# Patient Record
Sex: FEMALE | Race: WHITE | NOT HISPANIC OR LATINO | Employment: FULL TIME | URBAN - METROPOLITAN AREA
[De-identification: names, ages, dates, MRNs, and addresses within clinical notes are randomized per-mention and may not be internally consistent; named-entity substitution may affect disease eponyms.]

---

## 2021-09-07 ENCOUNTER — CLINICAL SUPPORT (OUTPATIENT)
Dept: BARIATRICS | Facility: CLINIC | Age: 31
End: 2021-09-07

## 2021-09-07 VITALS
HEART RATE: 70 BPM | BODY MASS INDEX: 42.89 KG/M2 | TEMPERATURE: 98 F | HEIGHT: 63 IN | WEIGHT: 242.1 LBS | SYSTOLIC BLOOD PRESSURE: 128 MMHG | DIASTOLIC BLOOD PRESSURE: 78 MMHG

## 2021-09-07 DIAGNOSIS — E03.9 HYPOTHYROID: ICD-10-CM

## 2021-09-07 DIAGNOSIS — Z01.812 BLOOD TESTS PRIOR TO TREATMENT OR PROCEDURE: ICD-10-CM

## 2021-09-07 DIAGNOSIS — Z01.818 PREOPERATIVE CLEARANCE: ICD-10-CM

## 2021-09-07 DIAGNOSIS — E66.01 OBESITY, CLASS III, BMI 40-49.9 (MORBID OBESITY) (HCC): Primary | ICD-10-CM

## 2021-09-07 PROCEDURE — RECHECK: Performed by: DIETITIAN, REGISTERED

## 2021-09-07 RX ORDER — LEVONORGESTREL AND ETHINYL ESTRADIOL 0.1-0.02MG
KIT ORAL
COMMUNITY
Start: 2021-08-13 | End: 2022-06-15

## 2021-09-07 RX ORDER — LEVOTHYROXINE SODIUM 112 UG/1
112 TABLET ORAL DAILY
Status: ON HOLD | COMMUNITY
Start: 2021-08-03 | End: 2022-06-14

## 2021-09-07 NOTE — PROGRESS NOTES
Bariatric Nutrition Assessment Note    Type of surgery    Preop 4 months  Surgery Date: TBD- tentative February 2021  Surgeon: TBD    Nutrition Assessment   Tenisha Kannan Gins  26 y o   female     Wt with BMI of 25: 141 1lbs  Pre-Op Excess Wt: 101lbs  Ht 5' 3" (1 6 m)   Wt 110 kg (242 lb 1 6 oz)   BMI 42 89 kg/m²     [place] [address]  Linkor Equation:     Weight maintenance= 2133 kcal/day  Estimated calories for weight loss 3626-3422 kcal/day ( 1-2# per wk wt loss - sedentary )  Estimated protein needs 64 2-77 g/day (1 0-1 2 gms/kg IBW )   Estimated fluid needs 0183-0942 ml/day (30-35 ml/kg IBW )      Weight History   Onset of Obesity: Childhood  Family history of obesity: Yes: mother had gastric bypass many years ago, sister had sleeve gastrectomy  Wt Loss Attempts: Commercial Programs ([employer], Ana Howell, etc )  Exercise  FAD Diets (Cabbage soup, Grapefruit, Cleanse, etc )  High Protein/Low CHO diets (Atkins, Union, etc )  Self Created Diets (Portion Control, Healthy Food Choices, etc )  Patient has tried the above for 6 months or more with insufficient weight loss or weight regain, which is why patient has requested to be evaluated for weight loss surgery today  Maximum Wt Lost: 45lbs    Review of History and Medications   No past medical history on file  No past surgical history on file    Social History     Socioeconomic History    Marital status: Single     Spouse name: Not on file    Number of children: Not on file    Years of education: Not on file    Highest education level: Not on file   Occupational History    Not on file   Tobacco Use    Smoking status: Not on file   Substance and Sexual Activity    Alcohol use: Not on file    Drug use: Not on file    Sexual activity: Not on file   Other Topics Concern    Not on file   Social History Narrative    Not on file     Social Determinants of Health     Financial Resource Strain:     Difficulty of Paying Living Expenses:    Food Insecurity:  Worried About [facility] in the Last Year:    [address] in the Last Year:    Transportation Needs:     Lack of Transportation (Medical):  Lack of Transportation (Non-Medical):    Physical Activity:     Days of Exercise per Week:     Minutes of Exercise per Session:    Stress:     Feeling of Stress :    Social Connections:     Frequency of Communication with Friends and Family:     Frequency of Social Gatherings with Friends and Family:     Attends Sabianism Services:     Active Member of Clubs or Organizations:     Attends Club or Organization Meetings:     Marital Status:    Intimate Partner Violence:     Fear of Current or Ex-Partner:     Emotionally Abused:     Physically Abused:     Sexually Abused:      No current outpatient medications on file  Food Intake and Lifestyle Assessment   Food Intake Assessment completed via usual diet recall  Very difficult diet recall by patient  Breakfast: Ninfa: ham, maurer, egg muffin sandwich with iced otr hot medium pumpkin coffee with cream   Sometimes skips  Sometimes eggs or yogurt    Snack: none   Lunch: piece of sausage  Snack: none  Dinner: chicken with mozzarella cheese and tomatoes, buffalo dip with tortila chips, pasta  Snack: none  Beverage intake: regular/sweetened green tea, regular mountain dew or coke, water, coffee  Protein supplement: none  Estimated protein intake per day: 30-60g  Estimated fluid intake per day: red solo cup of green tea, one water, soda occasionally  Meals eaten away from home: most: chipolte, pizza,  Sandwiches, etc  Typical meal pattern: 2 meals per day and 0 snacks per day  Eating Behaviors: Emotional eating, craves starches/carbohydrates, skips lunch  Food allergies or intolerances: Not on File FA  Cultural or Congregation considerations: none noted    Physical Assessment  Physical Activity  Types of exercise: Walking once a week or so for approx 20-30 mins  Current physical limitations: chronic fibromyalgia pain  Used to do kickboxing and [employer] workouts  Psychosocial Assessment   Support systems: spouse and 2 children  Socioeconomic factors: Pt's mother-in-law Miriam Mcdermott accompanied her to appointment    Nutrition Diagnosis  Diagnosis: Overweight / Obesity (NC-3 3), Excessive energy intake (NI-1 5), Inadequate protein intake (NI-5 7 3), Undesirable food choices (NB-1 7) and Disordered eating pattern (NB-1 5)  Related to: Food and nutrition-related knowledge deficit, Physical inactivity, Excessive energy intake and Inability or lack of desire to manage self-care  As Evidenced by: BMI >25, Excessive energy intake, Unintentional weight gain and Reports of disorded eating patterns     Nutrition Prescription: Recommend the following diet  Regular    Interventions and Teaching   Discussed pre-op and post-op nutrition guidelines  Patient educated and handouts provided  Surgical changes to stomach / GI  Capacity of post-surgery stomach  Diet progression  Adequate hydration  Sugar and fat restriction to decrease "dumping syndrome"  Expected weight loss  Weight loss plateaus/ possibility of weight regain  Exercise  Suggestions for pre-op diet  Nutrition considerations after surgery  Protein supplements  Meal planning and preparation  Appropriate carbohydrate, protein, and fat intake, and food/fluid choices to maximize safe weight loss, nutrient intake, and tolerance   Dietary and lifestyle changes  Possible problems with poor eating habits  Techniques for self monitoring and keeping daily food journal  Potential for food intolerance after surgery, and ways to deal with them including: lactose intolerance, nausea, reflux, vomiting, diarrhea, food intolerance, appetite changes, gas  Vitamin / Mineral supplementation of Multivitamin with minerals and Vitamin D  Pt currently takes Vitamin D3, history of deficiency      Patient is not currently pregnant and doesn't desire to become pregnant a minimum of one year post-op    Education provided to: patient and mother-in-law  Barriers to learning: No barriers identified  Readiness to change: contemplation and preparation  Prior research on procedure: internet, discussed with provider, pre-op class and friends or family  Comprehension: needs reinforcement and verbalizes understanding   Expected Compliance: good    Recommendations  Pt is an appropriate candidate for surgery   Yes    Evaluation / Monitoring  Dietitian to Monitor: Eating pattern as discussed Tolerance of nutrition prescription Body weight Lab values Physical activity Bowel pattern    Goals  Eliminate sugar sweetened beverages, Food journal, Exercise 30 minutes 5 times per week, Complete lession plans 1-6, Eat 3 meals per day and Eliminate mindless snacking    Time Spent:   1 Hour

## 2021-09-07 NOTE — PROGRESS NOTES
Bariatric Behavioral Health Evaluation    Presenting Problem patient here to improve health, increase activity, reduce chronic pain and prevent family disease  Is the patient seeking Bariatric Surgery Eval? Yes  If yes how long have you researched this surgery option  Her mother and sister both had bariatric surgery  Realizes Post- Op Requirements? Yes     Pre-morbid level of function and history of present illness: patient has medical issues  Psychiatric/Psychological Treatment Diagnosis: Patient reports Mental Health diagnosis of Depression  She is prescribed medication by her OB/GYN physician  Outpatient Counselor Yes; patient used counseling in the past to work on family related issues  Psychiatrist No     Have you had Inpatient Treatment? No    Family Constellation (include relationship with each and Psych/Med HX)    Mother  obesity and mental health illness and Siblings  obesity    Domestic Violence No      Additional comments/stressors related to family/relationships/peer support  Patient reports support from family and friends  Physical/Psychological Assessment:     Appearance: appropriate  Sociability: friendly  Affect: appropriate  Mood: calm  Thought Process: coherent  Speech: normal  Content: no impairment  Orientation: person  Yes , place  Yes , time  Yes , normal attention span  Yes , normal memory  Yes   and normal judgement  Yes   Insight: emotional  good    Risk Assessment:     none    Recommendations: Recommended for surgery  yes    Risk of Harm to Self or Others: none reported  Observation:     Interviews this interview only  Based on the previous information, the client presents the following risk of harm to self or others: low     Note Patient reports Mental Health diagnosis of Depression  Patient is prescribed medication by her PCP  Patient educated regarding the impact of nicotine and alcohol on the post surgery bariatric patient   Patient has a positive family history for obesity, and mental illness  Patient meets criteria for surgery at this program and is referred to the physician  BARIATRIC SURGERY EDUCATION CHECKLIST    I have received education related to my bariatric surgery process and understand:    Patients may be required to complete a psychiatric evaluation and receive clearance for surgery from their psychiatrist     Patients who undergo weight loss surgery are at higher risk of increased mental health concerns and suicide attempts  Patients may be required to complete a full substance abuse evaluation and then complete all treatment recommendations prior to surgery  If diagnosis of abuse/dependence results, patient may be required to remain sober for one (1) year before having bariatric surgery  Patients on psychiatric medications should check with their provider to discuss psychiatric medications and the changes in absorption  Patient should discuss all time release medications with provider and take all medications as prescribed  The recommendation is that there is no use of  any tobacco products, Hookah or  vapes for the bariatric post-operation patient  Bariatric surgery patients should not consume alcohol as a post-operative patient as it may increase risk of numerous health conditions including but not limited to alcohol abuse and ulcers  There is a possibility of weight regain if patient does not follow all program guidelines and recommendations  Bariatric surgery patients should exercise thirty (30) to sixty (60) minutes per day to maintain post-surgical weight loss  Research indicates that bariatric patients are more successful when they see a therapist for up to two (2) years post-op  Patients will follow all medical and dietary recommendations provided  Patient will keep all scheduled appointments and follow up with their physician for a minimum of five (5) years      Patient will take all vitamins as recommended  Post-operative vitamins are life-long  Patient reviewed Bariatric Surgery Education Checklist and agrees they have received education on these issues

## 2021-10-08 ENCOUNTER — TELEPHONE (OUTPATIENT)
Dept: OTHER | Facility: OTHER | Age: 31
End: 2021-10-08

## 2021-10-27 ENCOUNTER — OFFICE VISIT (OUTPATIENT)
Dept: BARIATRICS | Facility: CLINIC | Age: 31
End: 2021-10-27

## 2021-10-27 DIAGNOSIS — E66.01 OBESITY, CLASS III, BMI 40-49.9 (MORBID OBESITY) (HCC): Primary | ICD-10-CM

## 2021-10-27 PROCEDURE — RECHECK

## 2021-10-28 VITALS — BODY MASS INDEX: 42.99 KG/M2 | WEIGHT: 242.7 LBS

## 2021-11-18 ENCOUNTER — OFFICE VISIT (OUTPATIENT)
Dept: BARIATRICS | Facility: CLINIC | Age: 31
End: 2021-11-18

## 2021-11-18 VITALS — HEIGHT: 63 IN | BODY MASS INDEX: 42.77 KG/M2 | WEIGHT: 241.4 LBS

## 2021-11-18 DIAGNOSIS — E66.01 MORBID (SEVERE) OBESITY DUE TO EXCESS CALORIES (HCC): Primary | ICD-10-CM

## 2021-11-18 PROCEDURE — RECHECK: Performed by: DIETITIAN, REGISTERED

## 2021-12-14 DIAGNOSIS — Z01.818 PREOPERATIVE CLEARANCE: ICD-10-CM

## 2021-12-14 DIAGNOSIS — E03.9 HYPOTHYROID: ICD-10-CM

## 2021-12-14 DIAGNOSIS — R79.89 ABNORMAL TSH: Primary | ICD-10-CM

## 2021-12-14 DIAGNOSIS — E66.01 OBESITY, CLASS III, BMI 40-49.9 (MORBID OBESITY) (HCC): ICD-10-CM

## 2021-12-14 LAB
ALBUMIN SERPL-MCNC: 4.4 G/DL (ref 3.8–4.8)
ALBUMIN/GLOB SERPL: 1.8 {RATIO} (ref 1.2–2.2)
ALP SERPL-CCNC: 88 IU/L (ref 44–121)
ALT SERPL-CCNC: 16 IU/L (ref 0–32)
AST SERPL-CCNC: 17 IU/L (ref 0–40)
BASOPHILS # BLD AUTO: 0 X10E3/UL (ref 0–0.2)
BASOPHILS NFR BLD AUTO: 0 %
BILIRUB SERPL-MCNC: 0.6 MG/DL (ref 0–1.2)
BUN SERPL-MCNC: 12 MG/DL (ref 6–20)
BUN/CREAT SERPL: 16 (ref 9–23)
CALCIUM SERPL-MCNC: 9.3 MG/DL (ref 8.7–10.2)
CHLORIDE SERPL-SCNC: 102 MMOL/L (ref 96–106)
CHOLEST SERPL-MCNC: 161 MG/DL (ref 100–199)
CO2 SERPL-SCNC: 23 MMOL/L (ref 20–29)
CREAT SERPL-MCNC: 0.77 MG/DL (ref 0.57–1)
EOSINOPHIL # BLD AUTO: 0.3 X10E3/UL (ref 0–0.4)
EOSINOPHIL NFR BLD AUTO: 3 %
ERYTHROCYTE [DISTWIDTH] IN BLOOD BY AUTOMATED COUNT: 13.2 % (ref 11.7–15.4)
GLOBULIN SER-MCNC: 2.4 G/DL (ref 1.5–4.5)
GLUCOSE SERPL-MCNC: 80 MG/DL (ref 65–99)
HCT VFR BLD AUTO: 36.2 % (ref 34–46.6)
HDLC SERPL-MCNC: 45 MG/DL
HGB BLD-MCNC: 12.1 G/DL (ref 11.1–15.9)
IMM GRANULOCYTES # BLD: 0 X10E3/UL (ref 0–0.1)
IMM GRANULOCYTES NFR BLD: 0 %
LDLC SERPL CALC-MCNC: 88 MG/DL (ref 0–99)
LYMPHOCYTES # BLD AUTO: 2.8 X10E3/UL (ref 0.7–3.1)
LYMPHOCYTES NFR BLD AUTO: 28 %
MCH RBC QN AUTO: 29.8 PG (ref 26.6–33)
MCHC RBC AUTO-ENTMCNC: 33.4 G/DL (ref 31.5–35.7)
MCV RBC AUTO: 89 FL (ref 79–97)
MONOCYTES # BLD AUTO: 0.5 X10E3/UL (ref 0.1–0.9)
MONOCYTES NFR BLD AUTO: 5 %
NEUTROPHILS # BLD AUTO: 6.3 X10E3/UL (ref 1.4–7)
NEUTROPHILS NFR BLD AUTO: 64 %
PLATELET # BLD AUTO: 267 X10E3/UL (ref 150–450)
POTASSIUM SERPL-SCNC: 4 MMOL/L (ref 3.5–5.2)
PROT SERPL-MCNC: 6.8 G/DL (ref 6–8.5)
RBC # BLD AUTO: 4.06 X10E6/UL (ref 3.77–5.28)
SL AMB EGFR AFRICAN AMERICAN: 118 ML/MIN/1.73
SL AMB EGFR NON AFRICAN AMERICAN: 102 ML/MIN/1.73
SL AMB T4, FREE (DIRECT): 0.96 NG/DL (ref 0.82–1.77)
SL AMB VLDL CHOLESTEROL CALC: 28 MG/DL (ref 5–40)
SODIUM SERPL-SCNC: 140 MMOL/L (ref 134–144)
TRIGL SERPL-MCNC: 159 MG/DL (ref 0–149)
TSH SERPL DL<=0.005 MIU/L-ACNC: 7.15 UIU/ML (ref 0.45–4.5)
WBC # BLD AUTO: 10.1 X10E3/UL (ref 3.4–10.8)

## 2021-12-22 ENCOUNTER — OFFICE VISIT (OUTPATIENT)
Dept: BARIATRICS | Facility: CLINIC | Age: 31
End: 2021-12-22

## 2021-12-22 DIAGNOSIS — E66.01 OBESITY, CLASS III, BMI 40-49.9 (MORBID OBESITY) (HCC): Primary | ICD-10-CM

## 2021-12-22 PROCEDURE — RECHECK

## 2021-12-23 VITALS — BODY MASS INDEX: 42.25 KG/M2 | WEIGHT: 238.5 LBS

## 2022-01-19 ENCOUNTER — CONSULT (OUTPATIENT)
Dept: CARDIOLOGY CLINIC | Facility: CLINIC | Age: 32
End: 2022-01-19
Payer: COMMERCIAL

## 2022-01-19 VITALS
HEART RATE: 66 BPM | DIASTOLIC BLOOD PRESSURE: 50 MMHG | BODY MASS INDEX: 42.45 KG/M2 | WEIGHT: 239.6 LBS | SYSTOLIC BLOOD PRESSURE: 90 MMHG | HEIGHT: 63 IN

## 2022-01-19 DIAGNOSIS — E66.01 OBESITY, CLASS III, BMI 40-49.9 (MORBID OBESITY) (HCC): ICD-10-CM

## 2022-01-19 PROCEDURE — 99213 OFFICE O/P EST LOW 20 MIN: CPT | Performed by: INTERNAL MEDICINE

## 2022-01-19 PROCEDURE — 93000 ELECTROCARDIOGRAM COMPLETE: CPT | Performed by: INTERNAL MEDICINE

## 2022-01-19 RX ORDER — LEVOTHYROXINE SODIUM 0.12 MG/1
125 TABLET ORAL DAILY
COMMUNITY
Start: 2021-12-16

## 2022-01-19 NOTE — PROGRESS NOTES
Cardiology             Caitlyn Chou  1990  [id number]        Reason for consultation:  Preoperative cardiac clearance    Assessment/Plan:    1  Preoperative cardiac risk stratification:  No major preoperative cardiac risk factors, good functional capacity, no exertional symptoms  Normal ECG and cardiac examination  Suspect low cardiac risk for bariatric surgery with no further cardiac testing needed at this time  Patient encouraged to call if any changes prior to surgery  Follow-up as needed      Interval History: This is a very pleasant 17-year-old female with no prior cardiac history presents today for preoperative cardiac clearance prior to bariatric surgery  She does not exercise, but with exertional activities feels well without exertional chest pain, shortness of breath, dizziness, palpitations, lower extremity edema  She denies any prior history of syncope presyncope  She has had no prior cardiac testing in the past   She is lifelong nonsmoker and denies any family history of premature CAD, sudden death, or congenital cardiac abnormalities               Vitals:  Vitals:    01/19/22 1550   Weight: 109 kg (239 lb 9 6 oz)   Height: 5' 3" (1 6 m)         Past Medical History:   Diagnosis Date    Depression     Disease of thyroid gland      Social History     Socioeconomic History    Marital status: Single     Spouse name: Not on file    Number of children: Not on file    Years of education: Not on file    Highest education level: Not on file   Occupational History    Not on file   Tobacco Use    Smoking status: Never Smoker    Smokeless tobacco: Never Used   Vaping Use    Vaping Use: Never used   Substance and Sexual Activity    Alcohol use: Yes     Comment: rare    Drug use: Not Currently    Sexual activity: Not on file   Other Topics Concern    Not on file   Social History Narrative    Not on file     Social Determinants of Health     Financial Resource Strain: Not on file   Food Insecurity: Not on file   Transportation Needs: Not on file   Physical Activity: Not on file   Stress: Not on file   Social Connections: Not on file   Intimate Partner Violence: Not on file   Housing Stability: Not on file      Family History   Problem Relation Age of Onset    Depression Mother     Obesity Mother     Hypertension Father     Liver cancer Maternal Grandfather     Diabetes Maternal Grandfather     Colon cancer Paternal Grandfather      Past Surgical History:   Procedure Laterality Date     SECTION      DILATION AND CURETTAGE OF UTERUS      TONSILLECTOMY      WISDOM TOOTH EXTRACTION         Current Outpatient Medications:     levothyroxine 125 mcg tablet, , Disp: , Rfl:     sertraline (ZOLOFT) 50 mg tablet, 50 mg daily, Disp: , Rfl:     Larissia 0 1-20 MG-MCG per tablet, , Disp: , Rfl:     Multiple Vitamins-Minerals (VITAMIN D3 COMPLETE PO), Take by mouth (Patient not taking: Reported on 2022 ), Disp: , Rfl:     Unithroid 112 MCG tablet, Take 112 mcg by mouth daily (Patient not taking: Reported on 2022 ), Disp: , Rfl:         Review of Systems:  Review of Systems   Constitutional: Negative for activity change, fever and unexpected weight change  HENT: Negative for facial swelling, nosebleeds and voice change  Respiratory: Negative for chest tightness, shortness of breath and wheezing  Cardiovascular: Negative for chest pain, palpitations and leg swelling  Gastrointestinal: Negative for abdominal distention  Genitourinary: Negative for hematuria  Musculoskeletal: Negative for arthralgias  Skin: Negative for color change, pallor, rash and wound  Neurological: Negative for dizziness, seizures and syncope  Psychiatric/Behavioral: Negative for agitation  Physical Exam:  Physical Exam  Vitals reviewed  Constitutional:       Appearance: She is well-developed  She is obese  HENT:      Head: Normocephalic and atraumatic  Cardiovascular:      Rate and Rhythm: Normal rate and regular rhythm  Heart sounds: Normal heart sounds  Pulmonary:      Effort: Pulmonary effort is normal       Breath sounds: Normal breath sounds  Abdominal:      Palpations: Abdomen is soft  Musculoskeletal:         General: Normal range of motion  Cervical back: Normal range of motion and neck supple  Skin:     General: Skin is warm and dry  Neurological:      Mental Status: She is alert and oriented to person, place, and time  Psychiatric:         Behavior: Behavior normal          Thought Content: Thought content normal          Judgment: Judgment normal          This note was completed in part utilizing X-IO Direct Software  Grammatical errors, random word insertions, spelling mistakes, and incomplete sentences can be an occasional consequence of this system secondary to software limitations, ambient noise, and hardware issues  If you have any questions or concerns about the content, text, or information contained within the body of this dictation, please contact the provider for clarification

## 2022-01-27 LAB — TSH SERPL DL<=0.005 MIU/L-ACNC: 2.05 UIU/ML (ref 0.45–4.5)

## 2022-01-28 ENCOUNTER — OFFICE VISIT (OUTPATIENT)
Dept: BARIATRICS | Facility: CLINIC | Age: 32
End: 2022-01-28
Payer: COMMERCIAL

## 2022-01-28 VITALS
WEIGHT: 237.5 LBS | HEART RATE: 96 BPM | DIASTOLIC BLOOD PRESSURE: 76 MMHG | TEMPERATURE: 99.6 F | HEIGHT: 63 IN | SYSTOLIC BLOOD PRESSURE: 112 MMHG | BODY MASS INDEX: 42.08 KG/M2

## 2022-01-28 DIAGNOSIS — E66.01 MORBID (SEVERE) OBESITY DUE TO EXCESS CALORIES (HCC): Primary | ICD-10-CM

## 2022-01-28 PROCEDURE — 99203 OFFICE O/P NEW LOW 30 MIN: CPT | Performed by: SURGERY

## 2022-01-28 NOTE — PROGRESS NOTES
BARIATRIC CONSULT-INITIAL - BARIATRIC SURGERY  Rani Browne 33 y o  female MRN: [id number]  Unit/Bed#:  Encounter: [id number]      HPI:  Rani Browne is a 33 y o  female who presents with morbid obesity to discuss weight loss options  Review of Systems    Historical Information   Past Medical History:   Diagnosis Date    Depression     Disease of thyroid gland      Past Surgical History:   Procedure Laterality Date     SECTION      DILATION AND CURETTAGE OF UTERUS      TONSILLECTOMY      WISDOM TOOTH EXTRACTION       Social History   Social History     Substance and Sexual Activity   Alcohol Use Yes    Comment: rare     Social History     Substance and Sexual Activity   Drug Use Not Currently     Social History     Tobacco Use   Smoking Status Never Smoker   Smokeless Tobacco Never Used     Family History: non-contributory    Meds/Allergies   all medications and allergies reviewed  Allergies   Allergen Reactions    Clindamycin Anaphylaxis       Objective       Current Vitals:   Blood Pressure: 112/76 (22)  Pulse: 96 (22)  Temperature: 99 6 °F (37 6 °C) (22)  Temp Source: Tympanic (22)  Height: 5' 3" (160 cm) (22)  Weight - Scale: 108 kg (237 lb 8 oz) (22)  Body mass index is 42 07 kg/m²  Invasive Devices  Report    None                 Physical Exam    Lab Results: I have personally reviewed pertinent lab results  Imaging: I have personally reviewed pertinent reports  EKG, Pathology, and Other Studies: I have personally reviewed pertinent reports  Code Status: [unfilled]  Advance Directive and Living Will:      Power of :    POLST:      Assessment/PLAN:            Patient has a long history of morbid obesity and is presenting to discuss the surgical weight loss options  Despite the patient best efforts patient was unable to lose any meaningful or sustainable weight using nonsurgical means  We had a long discussion regarding all the surgical weight-loss options at our disposal at this point and reviewed the risks and benefits of each procedure in details as it relates to her age, BMI and medical conditions  Patient elected to undergo sleeve     Risks and benefits were explained to the patient  We also discussed the importance and need of a preoperative workup to make sure that the patient can undergo the procedure safely  Preoperative workup includes sleep apnea screening, cardiac evaluation, nutrition/psych and preoperative EGD  Risks and benefits of all the preoperative diagnostic tests were discussed with the patient including but not limited to the upper endoscopy  Alternatives to surgery and alternative forms of surgery were also explained  Postsurgical commitment and aftercare programs were discussed and explained to the patient in details   In terms of comorbidities patient suffers mostly of   Past Medical History:   Diagnosis Date    Depression     Disease of thyroid gland        I informed the patient that the rate of resolution of comorbid conditions following weight loss surgery is between 60 and 90% depending on the severity of the specific medical condition  I discussed and educated the patient regarding the different components of our multidisciplinary program and the importance of compliance and follow-up in the postoperative period  All questions answered  Patient understands risks and benefits  An image of the procedure was also shown to the patient  After showing the image we discussed all the technical aspects of the procedure and also the potential complications including but not limited to gastrointestinal perforation, leak, obstruction, stricture and hemorrhage  I spent 30 min with the patient more than 50% of the time was spent educating the patient and coordinating care

## 2022-02-04 ENCOUNTER — PREP FOR PROCEDURE (OUTPATIENT)
Dept: BARIATRICS | Facility: CLINIC | Age: 32
End: 2022-02-04

## 2022-02-04 DIAGNOSIS — E66.01 MORBID (SEVERE) OBESITY DUE TO EXCESS CALORIES (HCC): Primary | ICD-10-CM

## 2022-02-25 ENCOUNTER — OFFICE VISIT (OUTPATIENT)
Dept: BARIATRICS | Facility: CLINIC | Age: 32
End: 2022-02-25

## 2022-02-25 VITALS — WEIGHT: 237.2 LBS | BODY MASS INDEX: 42.02 KG/M2

## 2022-02-25 DIAGNOSIS — E66.01 OBESITY, CLASS III, BMI 40-49.9 (MORBID OBESITY) (HCC): Primary | ICD-10-CM

## 2022-02-25 PROCEDURE — RECHECK

## 2022-02-25 NOTE — PROGRESS NOTES
Virtual phone visit  AMWELL not available  Patient offered live visit; consenting to phone visit; my office door is closed for privacy; patient understands there is no charge for today's visit  WT CHK  Patient maintained her weight this month  Patient struggling with 30/60 rule and continues to persevere with it  Understands the importance of it; hard to get use to  Patient encouraged to continue to work at it and reminded it is a process to change such an engrained habit  Increase water intake; activity increased as well; more walks and out and about with children  Using smaller portions  Patient has completed workflow except for EGD scheduled 4/13  Making good progress towards surgery; scheduled for next month

## 2022-03-25 ENCOUNTER — OFFICE VISIT (OUTPATIENT)
Dept: BARIATRICS | Facility: CLINIC | Age: 32
End: 2022-03-25

## 2022-03-25 VITALS — HEIGHT: 63 IN | WEIGHT: 235.2 LBS | BODY MASS INDEX: 41.67 KG/M2

## 2022-03-25 DIAGNOSIS — E66.01 MORBID (SEVERE) OBESITY DUE TO EXCESS CALORIES (HCC): Primary | ICD-10-CM

## 2022-03-25 PROCEDURE — RECHECK: Performed by: DIETITIAN, REGISTERED

## 2022-03-25 NOTE — PROGRESS NOTES
Bariatric Nutrition Assessment Note    Type of surgery    Preop 4 months  Pt has completed 4 month program requirement   Surgery Date: TBD- tentative 2021  Surgeon: MOE    Nutrition Assessment   Amber Kennyth Hodgkin  33 y o   female     Wt with BMI of 25: 141 1lbs  Pre-Op Excess Wt: 101lbs  Ht 5' 3" (1 6 m)   Wt 107 kg (235 lb 3 2 oz)   BMI 41 66 kg/m²       Wt Readings from Last 3 Encounters:   22 108 kg (237 lb 3 2 oz)   22 108 kg (237 lb 8 oz)   22 109 kg (239 lb 9 6 oz)       [facility] Equation:     Weight maintenance= 2133 kcal/day  Estimated calories for weight loss 1555-0165 kcal/day ( 1-2# per wk wt loss - sedentary )  Estimated protein needs 64 2-77 g/day (1 0-1 2 gms/kg IBW )   Estimated fluid needs 1127-2421 ml/day (30-35 ml/kg IBW )      Weight History   Onset of Obesity: Childhood  Family history of obesity: Yes: mother had gastric bypass many years ago, sister had sleeve gastrectomy    Wt Loss Attempts: Commercial Programs ([employer], Donna Aidan, etc )  Exercise  FAD Diets (Cabbage soup, Grapefruit, Cleanse, etc )  High Protein/Low CHO diets (Atkins, Union, etc )  Self Created Diets (Portion Control, Healthy Food Choices, etc )  Patient has tried the above for 6 months or more with insufficient weight loss or weight regain, which is why patient has requested to be evaluated for weight loss surgery today  Maximum Wt Lost: 45lbs    Review of History and Medications   Past Medical History:   Diagnosis Date    Depression     Disease of thyroid gland      Past Surgical History:   Procedure Laterality Date     SECTION      DILATION AND CURETTAGE OF UTERUS      TONSILLECTOMY      WISDOM TOOTH EXTRACTION       Social History     Socioeconomic History    Marital status: Single     Spouse name: Not on file    Number of children: Not on file    Years of education: Not on file    Highest education level: Not on file   Occupational History    Not on file Tobacco Use    Smoking status: Never Smoker    Smokeless tobacco: Never Used   Vaping Use    Vaping Use: Never used   Substance and Sexual Activity    Alcohol use: Yes     Comment: rare    Drug use: Not Currently    Sexual activity: Not on file   Other Topics Concern    Not on file   Social History Narrative    Not on file     Social Determinants of Health     Financial Resource Strain: Not on file   Food Insecurity: Not on file   Transportation Needs: Not on file   Physical Activity: Not on file   Stress: Not on file   Social Connections: Not on file   Intimate Partner Violence: Not on file   Housing Stability: Not on file       Current Outpatient Medications:     Larissia 0 1-20 MG-MCG per tablet, , Disp: , Rfl:     levothyroxine 125 mcg tablet, , Disp: , Rfl:     Multiple Vitamins-Minerals (VITAMIN D3 COMPLETE PO), Take by mouth  , Disp: , Rfl:     sertraline (ZOLOFT) 50 mg tablet, 50 mg daily, Disp: , Rfl:     Unithroid 112 MCG tablet, Take 112 mcg by mouth daily (Patient not taking: Reported on 1/19/2022 ), Disp: , Rfl:    Has not yet started the MVI    Food Intake and Lifestyle Assessment   Food Intake Assessment completed via usual diet recall  Very difficult diet recall by patient  Breakfast: [employer]: ham, maurer, egg muffin sandwich with iced otr hot medium pumpkin coffee with cream   Sometimes skips  Sometimes eggs or yogurt   Not going to ninfa as often- doing scrambled eggs at home or greek yogurt  Snack: none   Lunch: piece of sausage leftovers or skips  Snack: none  6-7p Dinner: chicken with mozzarella cheese and tomatoes, buffalo dip with tortila chips, pasta trying to cook more at home, chicken and starch (other family members won't eat vegetables)   Snack: none  Beverage intake: regular/sweetened green tea, regular mountain dew or coke, water, coffee  Protein supplement: none  Estimated protein intake per day: 30-60g  Estimated fluid intake per day: red solo cup of green tea, one water, soda occasionally not buying soda as much anymore  Meals eaten away from home: most: chipolte, pizza,  Sandwiches, etc  Typical meal pattern: 2 meals per day and 0 snacks per day  Eating Behaviors: Emotional eating, craves starches/carbohydrates, skips lunch  Food allergies or intolerances: Allergies   Allergen Reactions    Clindamycin Anaphylaxis    FA  Cultural or Scientology considerations: none noted    Physical Assessment- no change  Physical Activity  Types of exercise: Walking once a week or so for approx 20-30 mins  Current physical limitations: chronic fibromyalgia pain  Used to do kickboxing and T-25 workouts  Psychosocial Assessment   Support systems: spouse and 2 children  Socioeconomic factors: Pt's mother-in-law Fabienne Vazquez accompanied her to appointment    Nutrition Diagnosis- continued  Diagnosis: Overweight / Obesity (NC-3 3), Excessive energy intake (NI-1 5), Inadequate protein intake (NI-5 7 3), Undesirable food choices (NB-1 7) and Disordered eating pattern (NB-1 5)  Related to: Food and nutrition-related knowledge deficit, Physical inactivity, Excessive energy intake and Inability or lack of desire to manage self-care  As Evidenced by: BMI >25, Excessive energy intake, Unintentional weight gain and Reports of disorded eating patterns     Nutrition Prescription: Recommend the following diet  Regular    Interventions and Teaching   Discussed pre-op and post-op nutrition guidelines  Patient educated and handouts provided    Surgical changes to stomach / GI  Capacity of post-surgery stomach  Diet progression  Adequate hydration  Sugar and fat restriction to decrease "dumping syndrome"  Expected weight loss  Weight loss plateaus/ possibility of weight regain  Exercise  Suggestions for pre-op diet  Nutrition considerations after surgery  Protein supplements  Meal planning and preparation  Appropriate carbohydrate, protein, and fat intake, and food/fluid choices to maximize safe weight loss, nutrient intake, and tolerance   Dietary and lifestyle changes  Possible problems with poor eating habits  Techniques for self monitoring and keeping daily food journal  Potential for food intolerance after surgery, and ways to deal with them including: lactose intolerance, nausea, reflux, vomiting, diarrhea, food intolerance, appetite changes, gas  Vitamin / Mineral supplementation of Multivitamin with minerals and Vitamin D  Pt currently takes Vitamin D3, history of deficiency  Patient is not currently pregnant and doesn't desire to become pregnant a minimum of one year post-op    Education provided to: patient and mother-in-law  Barriers to learning: No barriers identified  Readiness to change: contemplation and preparation  Prior research on procedure: internet, discussed with provider, pre-op class and friends or family  Comprehension: needs reinforcement and verbalizes understanding   Expected Compliance: good    Recommendations  Pt is an appropriate candidate for surgery  Yes    Evaluation / Monitoring  Dietitian to Monitor: Eating pattern as discussed Tolerance of nutrition prescription Body weight Lab values Physical activity Bowel pattern  Pt has changed to eating oatmeal and greek yogurt for breakfast   She continues to skip lunch  Still getting coffee twice pre week from [employer] do nuts , has changed to medium from large coffee   Pt has stopped soda and sweetened     Goals  Eliminate sugar sweetened beverages, Food journal, Exercise 30 minutes 5 times per week, Complete lession plans 1-6, Eat 3 meals per day and Eliminate mindless snacking    Work towards 3 consistent meals per day  Choose a lean protein at each meal  Eliminate sugar sweetened beverages    Workflow:   Psych and/or D+A Clearance: n/a   Bloodwork: done   PCP Letter: done   Support Group: done   Weight Loss: pre op goal 230#   Nicotine test: n/a   4 Month Pre-Operative Program: 4 month program completed    EGD 4/13/2022   Cardiac Risk Assessment: done    Sleep Studies: n/a    Time Spent:   30 minutes

## 2022-03-25 NOTE — PROGRESS NOTES
Bariatric Nutrition Follow-Up Note    Type of surgery    Preop 4 months completed 22  Surgery Date: TBD- tentative February-2022  Deadline Month 2022  Surgeon: Dr Mohr  33 y o   female     Wt with BMI of 25: 141 1lbs  Pre-Op Excess Wt: 101lbs  There were no vitals taken for this visit      [facility] Equation:     Weight maintenance= 2133 kcal/day  Estimated calories for weight loss 9495-1572 kcal/day ( 1-2# per wk wt loss - sedentary )  Estimated protein needs 64 2-77 g/day (1 0-1 2 gms/kg IBW )   Estimated fluid needs 4452-4077 ml/day (30-35 ml/kg IBW )      Review of History and Medications   Past Medical History:   Diagnosis Date    Depression     Disease of thyroid gland      Past Surgical History:   Procedure Laterality Date     SECTION      DILATION AND CURETTAGE OF UTERUS      TONSILLECTOMY      WISDOM TOOTH EXTRACTION       Social History     Socioeconomic History    Marital status: Single     Spouse name: Not on file    Number of children: Not on file    Years of education: Not on file    Highest education level: Not on file   Occupational History    Not on file   Tobacco Use    Smoking status: Never Smoker    Smokeless tobacco: Never Used   Vaping Use    Vaping Use: Never used   Substance and Sexual Activity    Alcohol use: Yes     Comment: rare    Drug use: Not Currently    Sexual activity: Not on file   Other Topics Concern    Not on file   Social History Narrative    Not on file     Social Determinants of Health     Financial Resource Strain: Not on file   Food Insecurity: Not on file   Transportation Needs: Not on file   Physical Activity: Not on file   Stress: Not on file   Social Connections: Not on file   Intimate Partner Violence: Not on file   Housing Stability: Not on file       Current Outpatient Medications:     Larissia 0 1-20 MG-MCG per tablet, , Disp: , Rfl:     levothyroxine 125 mcg tablet, , Disp: , Rfl:     Multiple Vitamins-Minerals (VITAMIN D3 COMPLETE PO), Take by mouth  , Disp: , Rfl:     sertraline (ZOLOFT) 50 mg tablet, 50 mg daily, Disp: , Rfl:     Unithroid 112 MCG tablet, Take 112 mcg by mouth daily (Patient not taking: Reported on 1/19/2022 ), Disp: , Rfl:    Has not yet started the MVI    Food Intake and Lifestyle Assessment -updates in bold  Food Intake Assessment completed via usual diet recall  Very difficult diet recall by patient  Breakfast: Ninfa: ham, maurer, egg muffin sandwich with iced otr hot medium pumpkin coffee with cream   Sometimes skips  Sometimes eggs or yogurt  Not going to ninfa as often- doing scrambled eggs at home or greek yogurt  Snack: none   Lunch: piece of sausage leftovers or skips  Snack: none  6-7p Dinner: chicken with mozzarella cheese and tomatoes, buffalo dip with tortila chips, pasta trying to cook more at home, chicken and starch (other family members won't eat vegetables)   Snack: none  Beverage intake: regular/sweetened green tea, regular mountain dew or coke, water, coffee  Protein supplement: none  Estimated protein intake per day: 30-60g  Estimated fluid intake per day: red solo cup of green tea, one water, soda occasionally not buying soda as much anymore  Meals eaten away from home: most: chipolte, pizza,  Sandwiches, etc  Typical meal pattern: 2 meals per day and 0 snacks per day  Eating Behaviors: Emotional eating, craves starches/carbohydrates, skips lunch  Food allergies or intolerances: Allergies   Allergen Reactions    Clindamycin Anaphylaxis    FA  Cultural or Mandaen considerations: none noted    Physical Assessment- no change  Physical Activity  Types of exercise: Walking once a week or so for approx 20-30 mins  Current physical limitations: chronic fibromyalgia pain  Used to do kickboxing and T-25 workouts      Psychosocial Assessment   Support systems: spouse and 2 children  Socioeconomic factors: Pt's mother-in-law Renee Alonso accompanied her to appointment    Nutrition Diagnosis- continued  Diagnosis: Overweight / Obesity (NC-3 3), Excessive energy intake (NI-1 5), Inadequate protein intake (NI-5 7 3), Undesirable food choices (NB-1 7) and Disordered eating pattern (NB-1 5)  Related to: Food and nutrition-related knowledge deficit, Physical inactivity, Excessive energy intake and Inability or lack of desire to manage self-care  As Evidenced by: BMI >25, Excessive energy intake, Unintentional weight gain and Reports of disorded eating patterns     Nutrition Prescription: Recommend the following diet  Regular    Interventions and Teaching   Discussed pre-op and post-op nutrition guidelines  Patient educated and handouts provided  Surgical changes to stomach / GI  Capacity of post-surgery stomach  Diet progression  Adequate hydration  Sugar and fat restriction to decrease "dumping syndrome"  Expected weight loss  Weight loss plateaus/ possibility of weight regain  Exercise  Suggestions for pre-op diet  Nutrition considerations after surgery  Protein supplements  Meal planning and preparation  Appropriate carbohydrate, protein, and fat intake, and food/fluid choices to maximize safe weight loss, nutrient intake, and tolerance   Dietary and lifestyle changes  Possible problems with poor eating habits  Techniques for self monitoring and keeping daily food journal  Potential for food intolerance after surgery, and ways to deal with them including: lactose intolerance, nausea, reflux, vomiting, diarrhea, food intolerance, appetite changes, gas  Vitamin / Mineral supplementation of Multivitamin with minerals and Vitamin D  Pt currently takes Vitamin D3, history of deficiency      Patient is not currently pregnant and doesn't desire to become pregnant a minimum of one year post-op    Education provided to: patient and mother-in-law  Barriers to learning: No barriers identified  Readiness to change: contemplation and preparation  Prior research on procedure: internet, discussed with provider, pre-op class and friends or family  Comprehension: needs reinforcement and verbalizes understanding   Expected Compliance: good    Recommendations  Pt is an appropriate candidate for surgery  Yes    Evaluation / Monitoring  Dietitian to Monitor: Eating pattern as discussed Tolerance of nutrition prescription Body weight Lab values Physical activity Bowel pattern    Goals  Eliminate sugar sweetened beverages, Food journal, Exercise 30 minutes 5 times per week, Complete lession plans 1-6, Eat 3 meals per day and Eliminate mindless snacking    Work towards 3 consistent meals per day  Choose a lean protein at each meal  Eliminate sugar sweetened beverages    Remaining Workflow:   Weight Loss: [id number]  83#   EGD scheduled for 4/13/2022    Time Spent:   30 minutes

## 2022-04-11 ENCOUNTER — TELEPHONE (OUTPATIENT)
Dept: BARIATRICS | Facility: CLINIC | Age: 32
End: 2022-04-11

## 2022-04-11 NOTE — TELEPHONE ENCOUNTER
I called her and left a message about Flonnie Harada being out of the office and I would be taking her case  I needed to know what procedure she wants preauthorized and also that her written cardiac clearance and EKG will be expiring in June she will need to have updated since we will be booking surgery around that time  Also she will need an updated CBC,CMP I will email her RD so she can have that completed to be scheduled for surgery once approved

## 2022-04-12 DIAGNOSIS — Z01.812 BLOOD TESTS PRIOR TO TREATMENT OR PROCEDURE: ICD-10-CM

## 2022-04-12 DIAGNOSIS — Z01.818 PREOPERATIVE CLEARANCE: ICD-10-CM

## 2022-04-12 DIAGNOSIS — E66.01 OBESITY, CLASS III, BMI 40-49.9 (MORBID OBESITY) (HCC): ICD-10-CM

## 2022-04-12 DIAGNOSIS — E66.01 MORBID (SEVERE) OBESITY DUE TO EXCESS CALORIES (HCC): Primary | ICD-10-CM

## 2022-04-13 ENCOUNTER — ANESTHESIA EVENT (OUTPATIENT)
Dept: GASTROENTEROLOGY | Facility: HOSPITAL | Age: 32
End: 2022-04-13

## 2022-04-13 ENCOUNTER — HOSPITAL ENCOUNTER (OUTPATIENT)
Dept: GASTROENTEROLOGY | Facility: HOSPITAL | Age: 32
Setting detail: OUTPATIENT SURGERY
Discharge: HOME | End: 2022-04-13
Attending: SURGERY | Admitting: SURGERY
Payer: COMMERCIAL

## 2022-04-13 ENCOUNTER — ANESTHESIA (OUTPATIENT)
Dept: GASTROENTEROLOGY | Facility: HOSPITAL | Age: 32
End: 2022-04-13

## 2022-04-13 VITALS
HEIGHT: 63 IN | DIASTOLIC BLOOD PRESSURE: 51 MMHG | RESPIRATION RATE: 16 BRPM | OXYGEN SATURATION: 97 % | BODY MASS INDEX: 41.8 KG/M2 | HEART RATE: 68 BPM | TEMPERATURE: 97.9 F | SYSTOLIC BLOOD PRESSURE: 94 MMHG | WEIGHT: 235.89 LBS

## 2022-04-13 DIAGNOSIS — E66.01 MORBID (SEVERE) OBESITY DUE TO EXCESS CALORIES (HCC): ICD-10-CM

## 2022-04-13 PROBLEM — F32.A DEPRESSION: Status: ACTIVE | Noted: 2022-04-13

## 2022-04-13 LAB
EXT PREGNANCY TEST URINE: NEGATIVE
EXT. CONTROL: ABNORMAL

## 2022-04-13 PROCEDURE — 88342 IMHCHEM/IMCYTCHM 1ST ANTB: CPT | Performed by: PATHOLOGY

## 2022-04-13 PROCEDURE — 43239 EGD BIOPSY SINGLE/MULTIPLE: CPT | Performed by: SURGERY

## 2022-04-13 PROCEDURE — 81025 URINE PREGNANCY TEST: CPT | Performed by: ANESTHESIOLOGY

## 2022-04-13 PROCEDURE — 88305 TISSUE EXAM BY PATHOLOGIST: CPT | Performed by: PATHOLOGY

## 2022-04-13 RX ORDER — PROPOFOL 10 MG/ML
INJECTION, EMULSION INTRAVENOUS AS NEEDED
Status: DISCONTINUED | OUTPATIENT
Start: 2022-04-13 | End: 2022-04-13

## 2022-04-13 RX ORDER — SODIUM CHLORIDE 9 MG/ML
125 INJECTION, SOLUTION INTRAVENOUS CONTINUOUS
Status: DISCONTINUED | OUTPATIENT
Start: 2022-04-13 | End: 2022-04-17 | Stop reason: HOSPADM

## 2022-04-13 RX ADMIN — PROPOFOL 200 MG: 10 INJECTION, EMULSION INTRAVENOUS at 07:30

## 2022-04-13 RX ADMIN — PROPOFOL 50 MG: 10 INJECTION, EMULSION INTRAVENOUS at 07:32

## 2022-04-13 RX ADMIN — SODIUM CHLORIDE 125 ML/HR: 0.9 INJECTION, SOLUTION INTRAVENOUS at 07:17

## 2022-04-13 NOTE — ANESTHESIA PREPROCEDURE EVALUATION
Procedure:  EGD    Relevant Problems   ANESTHESIA (within normal limits)      CARDIO (within normal limits)      ENDO  BMI 41 7      HEMATOLOGY (within normal limits)      MUSCULOSKELETAL (within normal limits)      NEURO/PSYCH  fibromyalgia   (+) Depression      PULMONARY (within normal limits)        Physical Exam    Airway    Mallampati score: II  TM Distance: >3 FB  Neck ROM: full     Dental   No notable dental hx     Cardiovascular  Rhythm: regular, Rate: normal, Cardiovascular exam normal    Pulmonary  Pulmonary exam normal Breath sounds clear to auscultation,     Other Findings        Anesthesia Plan  ASA Score- 2     Anesthesia Type- general with ASA Monitors  Additional Monitors:   Airway Plan:           Plan Factors-    Chart reviewed  Existing labs reviewed  Patient summary reviewed  Patient is not a current smoker  Induction- intravenous  Postoperative Plan-     Informed Consent- Anesthetic plan and risks discussed with patient

## 2022-04-13 NOTE — H&P
H&P EXAM - Outpatient Endoscopy  [facility] [address] [facility] GI LAB INTRA   Kalyan Zurita 33 y o  female MRN: [id number]  Unit/Bed#:  Encounter: [id number]        Impression: Morbid obesity    Plan:Upper endoscopy and a biopsy to rule out H  Pylori    Chief Complaint: Morbid obesity and preoperative endoscopy    Physical Exam: Normal not in acute distress   Chest: Clear to auscultation   Heart: Normal S1 and S2

## 2022-04-13 NOTE — ANESTHESIA POSTPROCEDURE EVALUATION
Post-Op Assessment Note    CV Status:  Stable  Pain Score: 0    Pain management: adequate     Mental Status:  Alert and awake   Hydration Status:  Euvolemic   PONV Controlled:  Controlled   Airway Patency:  Patent      Post Op Vitals Reviewed: Yes      Staff: Anesthesiologist         No complications documented      BP 94/51 (04/13/22 0753)    Temp      Pulse 68 (04/13/22 0753)   Resp      SpO2 97 % (04/13/22 0753)

## 2022-04-13 NOTE — DISCHARGE INSTRUCTIONS
Upper Endoscopy   WHAT YOU NEED TO KNOW:   An upper endoscopy is also called an upper gastrointestinal (GI) endoscopy, or an esophagogastroduodenoscopy (EGD)  You may feel bloated, gassy, or have some abdominal discomfort after your procedure  Your throat may be sore for 24 to 36 hours  You may burp or pass gas from air that is still inside your body  DISCHARGE INSTRUCTIONS:   Call 911 if:   · You have sudden chest pain or trouble breathing  Seek care immediately if:   · You feel dizzy or faint  · You have trouble swallowing  · You have severe throat pain  · Your bowel movements are very dark or black  · Your abdomen is hard and firm and you have severe pain  · You vomit blood  Contact your healthcare provider if:   · You feel full or bloated and cannot burp or pass gas  · You have not had a bowel movement for 3 days after your procedure  · You have neck pain  · You have a fever or chills  · You have nausea or are vomiting  · You have a rash or hives  · You have questions or concerns about your endoscopy  Relieve a sore throat:  Suck on throat lozenges or crushed ice  Gargle with a small amount of warm salt water  Mix 1 teaspoon of salt and 1 cup of warm water to make salt water  Relieve gas and discomfort from bloating:  Lie on your right side with a heating pad on your abdomen  Take short walks to help pass gas  Eat small meals until bloating is relieved  Rest after your procedure:  Do not drive or make important decisions until the day after your procedure  Return to your normal activity as directed  You can usually return to work the day after your procedure  Follow up with your healthcare provider as directed:  Write down your questions so you remember to ask them during your visits  © Copyright [employer] 2022 Information is for End User's use only and may not be sold, redistributed or otherwise used for commercial purposes   All illustrations and images included in CareNotes® are the copyrighted property of A D A M , Inc  or Danelle Farnsworth   The above information is an  only  It is not intended as medical advice for individual conditions or treatments  Talk to your doctor, nurse or pharmacist before following any medical regimen to see if it is safe and effective for you

## 2022-04-14 ENCOUNTER — PREP FOR PROCEDURE (OUTPATIENT)
Dept: BARIATRICS | Facility: CLINIC | Age: 32
End: 2022-04-14

## 2022-04-14 DIAGNOSIS — E66.01 MORBID OBESITY (HCC): Primary | ICD-10-CM

## 2022-04-27 ENCOUNTER — OFFICE VISIT (OUTPATIENT)
Dept: BARIATRICS | Facility: CLINIC | Age: 32
End: 2022-04-27

## 2022-04-27 VITALS — BODY MASS INDEX: 41.67 KG/M2 | WEIGHT: 235.2 LBS | HEIGHT: 63 IN

## 2022-04-27 DIAGNOSIS — E66.9 OBESITY, CLASS I, BMI 30-34.9: Primary | ICD-10-CM

## 2022-04-27 PROCEDURE — RECHECK

## 2022-04-27 NOTE — PROGRESS NOTES
Behavioral Health Follow Up Note:          Completed her four months of   Weight Check:  Dr Mendez Acosta: [employer]    Starting weight  242 1  Last time weight 235 2  Today's weight 236      Surgery month:  June 14 22    Mental health and wellness -  Tenisha shared that she had a difficult time last year with her mental health and was placed on anti-depressants for six months, but currently she expressed feeling better  Tenisha shared after her mothers passing she was seeing a therapist for a period of time finding it very helpful, but stopped after she moved  Tenisha spoke about  her younger sister getting  next week and she was looking forward to the wedding, but also expressed worry as it was also a stressful time  Jasson Ballesteros son is currently attending speech therapy, OT and will soon  be tested for Autism in which Tenisha explained that this was a hard time for her  Tenisha explained she puts her family first which then makes it difficult for her   to making time to manage healthy food prepping  Currently Tenisha explained that she is implementing more seafood into her diet, but at times because of how hectic her schedule becomes she does eat fast food trying to be careful by ordering a grilled chicken  Tenisha explained she walks 20-30 minutes a day when the weather permits  Tenisha spoke about hx of her mother and sister having the sleeve surgery and seeing  Negative choices they made had her questioning if she wanted the surgery, but after a while and how she felt that her mental health was being effected she decided to try getting surgery       Progress toward program requirements    Workflow:  · Psych and/or D+A Clearance: n/a  · PCP Letter: completed  · Support Group: done  · Surgeon Appt :  June 14,2022  · EGD : done  · Cardiac Risk Assessment: 1/19/22  · Sleep Studies: n/a  · Bloodwork: to get it done by the end of May       Goals:1- one day out of the week plan meals 2-use fid bid 3- journal   Focus in session: Educated Tenisha on use  mindfulness tech to [employer] [place] [employer] [place] focus on her daily goal to decrease not feeling rushed and worrying about all the things she would like to get accomplished, Reviewed how seeing  a therapist can help increase her support system  Explored  why asking for help was difficult for her and how accepting help from her supports can provide her with some  free some time for Tenisha to focus on meal prep and self care

## 2022-05-25 ENCOUNTER — TELEPHONE (OUTPATIENT)
Dept: BARIATRICS | Facility: CLINIC | Age: 32
End: 2022-05-25

## 2022-05-26 ENCOUNTER — OFFICE VISIT (OUTPATIENT)
Dept: BARIATRICS | Facility: CLINIC | Age: 32
End: 2022-05-26
Payer: COMMERCIAL

## 2022-05-26 ENCOUNTER — CLINICAL SUPPORT (OUTPATIENT)
Dept: BARIATRICS | Facility: CLINIC | Age: 32
End: 2022-05-26

## 2022-05-26 VITALS
TEMPERATURE: 99.3 F | WEIGHT: 235 LBS | SYSTOLIC BLOOD PRESSURE: 112 MMHG | HEIGHT: 63 IN | DIASTOLIC BLOOD PRESSURE: 70 MMHG | BODY MASS INDEX: 41.64 KG/M2 | HEART RATE: 82 BPM

## 2022-05-26 DIAGNOSIS — E66.01 OBESITY, CLASS III, BMI 40-49.9 (MORBID OBESITY) (HCC): Primary | ICD-10-CM

## 2022-05-26 LAB
ALBUMIN SERPL-MCNC: 4.3 G/DL (ref 3.8–4.8)
ALBUMIN/GLOB SERPL: 1.8 {RATIO} (ref 1.2–2.2)
ALP SERPL-CCNC: 76 IU/L (ref 44–121)
ALT SERPL-CCNC: 13 IU/L (ref 0–32)
AST SERPL-CCNC: 12 IU/L (ref 0–40)
BASOPHILS # BLD AUTO: 0 X10E3/UL (ref 0–0.2)
BASOPHILS NFR BLD AUTO: 0 %
BILIRUB SERPL-MCNC: 0.5 MG/DL (ref 0–1.2)
BUN SERPL-MCNC: 13 MG/DL (ref 6–20)
BUN/CREAT SERPL: 16 (ref 9–23)
CALCIUM SERPL-MCNC: 9.2 MG/DL (ref 8.7–10.2)
CHLORIDE SERPL-SCNC: 104 MMOL/L (ref 96–106)
CO2 SERPL-SCNC: 22 MMOL/L (ref 20–29)
CREAT SERPL-MCNC: 0.82 MG/DL (ref 0.57–1)
EGFR: 97 ML/MIN/1.73
EOSINOPHIL # BLD AUTO: 0.2 X10E3/UL (ref 0–0.4)
EOSINOPHIL NFR BLD AUTO: 3 %
ERYTHROCYTE [DISTWIDTH] IN BLOOD BY AUTOMATED COUNT: 13.4 % (ref 11.7–15.4)
GLOBULIN SER-MCNC: 2.4 G/DL (ref 1.5–4.5)
GLUCOSE SERPL-MCNC: 84 MG/DL (ref 65–99)
HCT VFR BLD AUTO: 34.9 % (ref 34–46.6)
HGB BLD-MCNC: 11.7 G/DL (ref 11.1–15.9)
IMM GRANULOCYTES # BLD: 0 X10E3/UL (ref 0–0.1)
IMM GRANULOCYTES NFR BLD: 0 %
LYMPHOCYTES # BLD AUTO: 2.4 X10E3/UL (ref 0.7–3.1)
LYMPHOCYTES NFR BLD AUTO: 33 %
MCH RBC QN AUTO: 28.7 PG (ref 26.6–33)
MCHC RBC AUTO-ENTMCNC: 33.5 G/DL (ref 31.5–35.7)
MCV RBC AUTO: 86 FL (ref 79–97)
MONOCYTES # BLD AUTO: 0.4 X10E3/UL (ref 0.1–0.9)
MONOCYTES NFR BLD AUTO: 5 %
NEUTROPHILS # BLD AUTO: 4.3 X10E3/UL (ref 1.4–7)
NEUTROPHILS NFR BLD AUTO: 59 %
PLATELET # BLD AUTO: 268 X10E3/UL (ref 150–450)
POTASSIUM SERPL-SCNC: 4.4 MMOL/L (ref 3.5–5.2)
PROT SERPL-MCNC: 6.7 G/DL (ref 6–8.5)
RBC # BLD AUTO: 4.07 X10E6/UL (ref 3.77–5.28)
SODIUM SERPL-SCNC: 139 MMOL/L (ref 134–144)
WBC # BLD AUTO: 7.3 X10E3/UL (ref 3.4–10.8)

## 2022-05-26 PROCEDURE — RECHECK: Performed by: DIETITIAN, REGISTERED

## 2022-05-26 PROCEDURE — 99213 OFFICE O/P EST LOW 20 MIN: CPT | Performed by: SURGERY

## 2022-05-26 RX ORDER — SCOLOPAMINE TRANSDERMAL SYSTEM 1 MG/1
1 PATCH, EXTENDED RELEASE TRANSDERMAL ONCE
Status: CANCELLED | OUTPATIENT
Start: 2022-06-14 | End: 2022-05-26

## 2022-05-26 RX ORDER — GABAPENTIN 300 MG/1
600 CAPSULE ORAL ONCE
Status: CANCELLED | OUTPATIENT
Start: 2022-06-14 | End: 2022-05-26

## 2022-05-26 RX ORDER — CELECOXIB 200 MG/1
200 CAPSULE ORAL ONCE
Status: CANCELLED | OUTPATIENT
Start: 2022-06-14 | End: 2022-05-26

## 2022-05-26 RX ORDER — ACETAMINOPHEN 325 MG/1
975 TABLET ORAL ONCE
Status: CANCELLED | OUTPATIENT
Start: 2022-06-14 | End: 2022-05-26

## 2022-05-26 RX ORDER — ENOXAPARIN SODIUM 100 MG/ML
40 INJECTION SUBCUTANEOUS
Status: CANCELLED | OUTPATIENT
Start: 2022-06-14 | End: 2022-06-15

## 2022-05-26 RX ORDER — CEFAZOLIN SODIUM 2 G/50ML
2000 SOLUTION INTRAVENOUS ONCE
Status: CANCELLED | OUTPATIENT
Start: 2022-06-14 | End: 2022-05-26

## 2022-05-26 NOTE — H&P (VIEW-ONLY)
BARIATRIC HISTORY AND PHYSICAL - BARIATRIC SURGERY  Marleni Hanks 33 y o  female MRN: [id number]  Unit/Bed#:  Encounter: [id number]      HPI:  Marleni Hanks is a 33 y o  female who presents to review their preoperative workup and see if they are a good candidate to undergo a bariatric procedure  Review of Systems   Constitutional: Negative for chills and fever  HENT: Negative for ear pain and sore throat  Eyes: Negative for pain and visual disturbance  Respiratory: Negative for cough and shortness of breath  Cardiovascular: Negative for chest pain and palpitations  Gastrointestinal: Negative for abdominal pain and vomiting  Genitourinary: Negative for dysuria and hematuria  Musculoskeletal: Negative for arthralgias and back pain  Skin: Negative for color change and rash  Neurological: Negative for seizures and syncope  All other systems reviewed and are negative        Historical Information   Past Medical History:   Diagnosis Date    Depression     Disease of thyroid gland     Fibromyalgia, primary      Past Surgical History:   Procedure Laterality Date     SECTION      DILATION AND CURETTAGE OF UTERUS      TONSILLECTOMY      WISDOM TOOTH EXTRACTION       Social History   Social History     Substance and Sexual Activity   Alcohol Use Yes    Comment: rare     Social History     Substance and Sexual Activity   Drug Use Not Currently     Social History     Tobacco Use   Smoking Status Never Smoker   Smokeless Tobacco Never Used     Family History: non-contributory    Meds/Allergies   all medications and allergies reviewed  Allergies   Allergen Reactions    Clindamycin Anaphylaxis       Objective     Current Vitals:   Blood Pressure: 112/70 (22 1334)  Pulse: 82 (22 1334)  Temperature: 99 3 °F (37 4 °C) (22 1334)  Temp Source: Tympanic (22 1334)  Height: 5' 3" (160 cm) (22 1334)  Weight - Scale: 107 kg (235 lb) (22 1334)  bowel movement  [unfilled]    Invasive Devices  Report    None                 Physical Exam  Constitutional:       Appearance: Normal appearance  She is obese  HENT:      Head: Normocephalic and atraumatic  Cardiovascular:      Rate and Rhythm: Normal rate and regular rhythm  Pulmonary:      Effort: Pulmonary effort is normal       Breath sounds: Normal breath sounds  Abdominal:      General: Abdomen is flat  Palpations: Abdomen is soft  Comments: No rashes   Musculoskeletal:         General: Normal range of motion  Skin:     General: Skin is warm and dry  Neurological:      General: No focal deficit present  Mental Status: She is alert and oriented to person, place, and time  Psychiatric:         Mood and Affect: Mood normal          Behavior: Behavior normal          Lab Results: I have personally reviewed pertinent lab results  Imaging: I have personally reviewed pertinent reports  EKG, Pathology, and Other Studies: I have personally reviewed pertinent reports  Code Status: [unfilled]  Advance Directive and Living Will:      Power of :    POLST:        Assessment/Plan:    The patient presented to review the preoperative workup and see if bariatric surgery is appropriate and indicated following the extensive preoperative workup and the enrollment in our weight loss program   Preoperative workup was complete  Results were reviewed with the patient including the blood work results and the endoscopy findings and the biopsy results  We also reviewed the cardiology evaluation  The patient was determined to be a good candidate for laparoscopic-robot assisted sleeve gastrectomy    ----------------------------------------------------------------------  Smoking: Never  Blood thinner use: No  Home pain medication use and who manages it: Denies  CPAP use: No (If yes, sleep study obtained and reminded pt to bring CPAP to hospital)  History of blood clots: No  Previous abdominal surgeries:  x2  Cardiac clearance obtained: Yes, on 22 by Dr Vicenta Russell   EKG performed: 22, NSR  EGD prior to surgery: Yes, small hiatal hernia  Screening Labs obtained (CBC, CMP): Will obtain prior to surgery  H  Pylori status: Negative   Medication allergies: Clindamycin (Jaz!)  SLIM consult Post-Op: No  Reminded patient about 2 week pre-op liquid diet: Yes  10% body weight loss pre-operatively met/discussed: N/A  Consent signed: Yes  Pre-Op ERAS Orders placed: DONE  -----------------------------------------------------------------------  Risks and benefits explained one more time to the patient  Alternatives to surgery and alternative forms of surgery were also explained  Post-surgical commitment and after care programs were explained  We also discussed that the [employer] robot may be available to us to use on the day of the patient procedure and that the procedure may be performed robotically  I discussed in details the advantages and disadvantages of this approach including the potential decrease in postoperative pain because of the remote center technology  I also mentioned the lack of strong evidence for the use of robot in bariatric patients and the potential disadvantage to patients because of the prolonged operative time  Consent was signed  Questions were answered and concerns were addressed  Patient will need to start the 2 week liquid diet prior to surgery  Patient wishes to proceed  As per [address] [place] [address] guidelines, I had a discussion with the patient regarding their CODE STATUS in the perioperative period and the patient is level 1 or FULL CODE STATUS      Justin Garcia PA-C  Bariatric Surgery   2022  1:40 PM

## 2022-05-26 NOTE — PROGRESS NOTES
BARIATRIC HISTORY AND PHYSICAL - BARIATRIC SURGERY  Aleks Harper 33 y o  female MRN: [id number]  Unit/Bed#:  Encounter: [id number]      HPI:  Aleks Harper is a 33 y o  female who presents to review their preoperative workup and see if they are a good candidate to undergo a bariatric procedure  Review of Systems   Constitutional: Negative for chills and fever  HENT: Negative for ear pain and sore throat  Eyes: Negative for pain and visual disturbance  Respiratory: Negative for cough and shortness of breath  Cardiovascular: Negative for chest pain and palpitations  Gastrointestinal: Negative for abdominal pain and vomiting  Genitourinary: Negative for dysuria and hematuria  Musculoskeletal: Negative for arthralgias and back pain  Skin: Negative for color change and rash  Neurological: Negative for seizures and syncope  All other systems reviewed and are negative        Historical Information   Past Medical History:   Diagnosis Date    Depression     Disease of thyroid gland     Fibromyalgia, primary      Past Surgical History:   Procedure Laterality Date     SECTION      DILATION AND CURETTAGE OF UTERUS      TONSILLECTOMY      WISDOM TOOTH EXTRACTION       Social History   Social History     Substance and Sexual Activity   Alcohol Use Yes    Comment: rare     Social History     Substance and Sexual Activity   Drug Use Not Currently     Social History     Tobacco Use   Smoking Status Never Smoker   Smokeless Tobacco Never Used     Family History: non-contributory    Meds/Allergies   all medications and allergies reviewed  Allergies   Allergen Reactions    Clindamycin Anaphylaxis       Objective     Current Vitals:   Blood Pressure: 112/70 (22 1334)  Pulse: 82 (22 1334)  Temperature: 99 3 °F (37 4 °C) (22 1334)  Temp Source: Tympanic (22 1334)  Height: 5' 3" (160 cm) (22 1334)  Weight - Scale: 107 kg (235 lb) (22 1334)  bowel movement  [unfilled]    Invasive Devices  Report    None                 Physical Exam  Constitutional:       Appearance: Normal appearance  She is obese  HENT:      Head: Normocephalic and atraumatic  Cardiovascular:      Rate and Rhythm: Normal rate and regular rhythm  Pulmonary:      Effort: Pulmonary effort is normal       Breath sounds: Normal breath sounds  Abdominal:      General: Abdomen is flat  Palpations: Abdomen is soft  Comments: No rashes   Musculoskeletal:         General: Normal range of motion  Skin:     General: Skin is warm and dry  Neurological:      General: No focal deficit present  Mental Status: She is alert and oriented to person, place, and time  Psychiatric:         Mood and Affect: Mood normal          Behavior: Behavior normal          Lab Results: I have personally reviewed pertinent lab results  Imaging: I have personally reviewed pertinent reports  EKG, Pathology, and Other Studies: I have personally reviewed pertinent reports  Code Status: [unfilled]  Advance Directive and Living Will:      Power of :    POLST:        Assessment/Plan:    The patient presented to review the preoperative workup and see if bariatric surgery is appropriate and indicated following the extensive preoperative workup and the enrollment in our weight loss program   Preoperative workup was complete  Results were reviewed with the patient including the blood work results and the endoscopy findings and the biopsy results  We also reviewed the cardiology evaluation  The patient was determined to be a good candidate for laparoscopic-robot assisted sleeve gastrectomy    ----------------------------------------------------------------------  Smoking: Never  Blood thinner use: No  Home pain medication use and who manages it: Denies  CPAP use: No (If yes, sleep study obtained and reminded pt to bring CPAP to hospital)  History of blood clots: No  Previous abdominal surgeries:  x2  Cardiac clearance obtained: Yes, on 22 by Dr Corinne Hoar   EKG performed: 22, NSR  EGD prior to surgery: Yes, small hiatal hernia  Screening Labs obtained (CBC, CMP): Will obtain prior to surgery  H  Pylori status: Negative   Medication allergies: Clindamycin (Jaz!)  SLIM consult Post-Op: No  Reminded patient about 2 week pre-op liquid diet: Yes  10% body weight loss pre-operatively met/discussed: N/A  Consent signed: Yes  Pre-Op ERAS Orders placed: DONE  -----------------------------------------------------------------------  Risks and benefits explained one more time to the patient  Alternatives to surgery and alternative forms of surgery were also explained  Post-surgical commitment and after care programs were explained  We also discussed that the [employer] robot may be available to us to use on the day of the patient procedure and that the procedure may be performed robotically  I discussed in details the advantages and disadvantages of this approach including the potential decrease in postoperative pain because of the remote center technology  I also mentioned the lack of strong evidence for the use of robot in bariatric patients and the potential disadvantage to patients because of the prolonged operative time  Consent was signed  Questions were answered and concerns were addressed  Patient will need to start the 2 week liquid diet prior to surgery  Patient wishes to proceed  As per [address] [place] [address] guidelines, I had a discussion with the patient regarding their CODE STATUS in the perioperative period and the patient is level 1 or FULL CODE STATUS      Elmo Marks PA-C  Bariatric Surgery   2022  1:40 PM

## 2022-05-26 NOTE — PROGRESS NOTES
Weight Management Nutrition Class         Bariatric Surgeon: Dr Allan Cerrato    Surgery: pre op sleeve     Class: Pre Op Class     Topics discussed today include:     Pt attended pre-op education session  Standardized packet of information for bariatric surgery was sent via email and was reviewed with pt  Importance of lifestyle change and development of regular exercise routine stressed  Pt given the opportunity to ask questions  Ensure pre-surgery drink instructions were given  Questions were answered  Pt verbalized understanding of all information provided  Pt appeared prepared for upcoming surgery  Pt  educated on two-week pre operative liver shrinking diet  Pt understands that the diet needs to be followed for 2 weeks prior to surgery  Handout reviewed  Emphasized the need to drink 80 ounces of fluid per day while on the diet  Reviewed pre-op ERAS drink, post-operative clear liquid, full liquid, and pureed diet, post-operative nutrition rules and facts, and post-operative bariatric multivitamin/mineral recommendations and brand comparison  Contact information provided for any questions/concerns  Patient was able to verbalize basic diet (protein, fluid, vitamin and mineral) recommendations and possible nutrition-related complications   Yes

## 2022-05-27 DIAGNOSIS — E66.01 OBESITY, CLASS III, BMI 40-49.9 (MORBID OBESITY) (HCC): Primary | ICD-10-CM

## 2022-05-31 RX ORDER — ENOXAPARIN SODIUM 100 MG/ML
40 INJECTION SUBCUTANEOUS DAILY
Qty: 5.2 ML | Refills: 0 | Status: SHIPPED | OUTPATIENT
Start: 2022-06-15 | End: 2022-06-28

## 2022-05-31 RX ORDER — OMEPRAZOLE 20 MG/1
20 CAPSULE, DELAYED RELEASE ORAL DAILY
Qty: 30 CAPSULE | Refills: 3 | Status: SHIPPED | OUTPATIENT
Start: 2022-06-15 | End: 2022-07-05

## 2022-05-31 RX ORDER — OXYCODONE HYDROCHLORIDE 5 MG/1
5 TABLET ORAL EVERY 4 HOURS PRN
Qty: 10 TABLET | Refills: 0 | Status: SHIPPED | OUTPATIENT
Start: 2022-06-15

## 2022-06-03 NOTE — PRE-PROCEDURE INSTRUCTIONS
Pre-Surgery Instructions:   Medication Instructions    levothyroxine 125 mcg tablet Take day of surgery   Multiple Vitamins-Minerals (VITAMIN D3 COMPLETE PO) Stop taking 7 days prior to surgery   VITAMIN D PO Stop taking 7 days prior to surgery  Covid screening negative as per patient  Reviewed pre op medicine and showering instructions with patient via phone call, verbalizes understanding  Advised patient to stop taking non prescribed vitamins, herbal meds and ASA 7 days pre op  Advised to stop taking NSAID's 3 days pre op but may take Tylenol products if needed  Advised to take DOS medicine with a small sip water  Advised NPO after MN prior to surgery and surgical services will call with scheduled time of hospital arrival     Aware of 3 carb drinks pre op as instructed by surgeon's office  Pt verbalized understanding of all instructions

## 2022-06-07 ENCOUNTER — TELEPHONE (OUTPATIENT)
Dept: BARIATRICS | Facility: CLINIC | Age: 32
End: 2022-06-07

## 2022-06-10 ENCOUNTER — ANESTHESIA EVENT (OUTPATIENT)
Dept: PERIOP | Facility: HOSPITAL | Age: 32
DRG: 621 | End: 2022-06-10
Payer: COMMERCIAL

## 2022-06-13 PROBLEM — Z98.890 PONV (POSTOPERATIVE NAUSEA AND VOMITING): Status: ACTIVE | Noted: 2022-06-13

## 2022-06-13 PROBLEM — R11.2 PONV (POSTOPERATIVE NAUSEA AND VOMITING): Status: ACTIVE | Noted: 2022-06-13

## 2022-06-13 NOTE — ANESTHESIA PREPROCEDURE EVALUATION
Procedure:  LAP SLEEVE GASTRECTOMY &INTRAOP EGD W/ ROBOT (N/A Abdomen)    Relevant Problems   ANESTHESIA   (+) PONV (postoperative nausea and vomiting)      MUSCULOSKELETAL   (+) Fibromyalgia, primary      NEURO/PSYCH   (+) Depression   (+) Fibromyalgia, primary        Physical Exam    Airway    Mallampati score: III  TM Distance: >3 FB  Neck ROM: full     Dental   No notable dental hx     Cardiovascular  Rhythm: regular, Rate: normal, Cardiovascular exam normal    Pulmonary  Pulmonary exam normal Breath sounds clear to auscultation,     Other Findings        Anesthesia Plan  ASA Score- 3     Anesthesia Type- general with ASA Monitors  Additional Monitors:   Airway Plan: ETT  Plan Factors-    Chart reviewed  Existing labs reviewed  Patient summary reviewed  Patient is not a current smoker  Induction- intravenous  Postoperative Plan-     Informed Consent- Anesthetic plan and risks discussed with patient

## 2022-06-14 ENCOUNTER — HOSPITAL ENCOUNTER (INPATIENT)
Facility: HOSPITAL | Age: 32
LOS: 1 days | Discharge: HOME | DRG: 621 | End: 2022-06-15
Attending: SURGERY | Admitting: SURGERY
Payer: COMMERCIAL

## 2022-06-14 ENCOUNTER — ANESTHESIA (OUTPATIENT)
Dept: PERIOP | Facility: HOSPITAL | Age: 32
DRG: 621 | End: 2022-06-14
Payer: COMMERCIAL

## 2022-06-14 DIAGNOSIS — E66.01 MORBID OBESITY (HCC): ICD-10-CM

## 2022-06-14 DIAGNOSIS — Z98.84 BARIATRIC SURGERY STATUS: Primary | ICD-10-CM

## 2022-06-14 LAB
EXT PREGNANCY TEST URINE: NEGATIVE
EXT. CONTROL: NORMAL

## 2022-06-14 PROCEDURE — 88307 TISSUE EXAM BY PATHOLOGIST: CPT | Performed by: PATHOLOGY

## 2022-06-14 PROCEDURE — 8E0W4CZ ROBOTIC ASSISTED PROCEDURE OF TRUNK REGION, PERCUTANEOUS ENDOSCOPIC APPROACH: ICD-10-PCS | Performed by: SURGERY

## 2022-06-14 PROCEDURE — 0DB64Z3 EXCISION OF STOMACH, PERCUTANEOUS ENDOSCOPIC APPROACH, VERTICAL: ICD-10-PCS | Performed by: SURGERY

## 2022-06-14 PROCEDURE — 43775 LAP SLEEVE GASTRECTOMY: CPT | Performed by: SURGERY

## 2022-06-14 PROCEDURE — C9290 INJ, BUPIVACAINE LIPOSOME: HCPCS | Performed by: PHYSICIAN ASSISTANT

## 2022-06-14 PROCEDURE — 0DJ08ZZ INSPECTION OF UPPER INTESTINAL TRACT, VIA NATURAL OR ARTIFICIAL OPENING ENDOSCOPIC: ICD-10-PCS | Performed by: SURGERY

## 2022-06-14 PROCEDURE — S2900 ROBOTIC SURGICAL SYSTEM: HCPCS | Performed by: SURGERY

## 2022-06-14 PROCEDURE — 81025 URINE PREGNANCY TEST: CPT | Performed by: ANESTHESIOLOGY

## 2022-06-14 RX ORDER — SODIUM CHLORIDE 9 MG/ML
INJECTION INTRAVENOUS AS NEEDED
Status: DISCONTINUED | OUTPATIENT
Start: 2022-06-14 | End: 2022-06-14 | Stop reason: HOSPADM

## 2022-06-14 RX ORDER — CELECOXIB 200 MG/1
200 CAPSULE ORAL ONCE
Status: COMPLETED | OUTPATIENT
Start: 2022-06-14 | End: 2022-06-14

## 2022-06-14 RX ORDER — FAMOTIDINE 10 MG/ML
20 INJECTION, SOLUTION INTRAVENOUS EVERY 12 HOURS SCHEDULED
Status: DISCONTINUED | OUTPATIENT
Start: 2022-06-14 | End: 2022-06-15 | Stop reason: HOSPADM

## 2022-06-14 RX ORDER — OXYCODONE HCL 5 MG/5 ML
5 SOLUTION, ORAL ORAL EVERY 4 HOURS PRN
Status: DISCONTINUED | OUTPATIENT
Start: 2022-06-14 | End: 2022-06-15 | Stop reason: HOSPADM

## 2022-06-14 RX ORDER — GLYCOPYRROLATE 0.2 MG/ML
INJECTION INTRAMUSCULAR; INTRAVENOUS AS NEEDED
Status: DISCONTINUED | OUTPATIENT
Start: 2022-06-14 | End: 2022-06-14

## 2022-06-14 RX ORDER — HYDROMORPHONE HCL 110MG/55ML
PATIENT CONTROLLED ANALGESIA SYRINGE INTRAVENOUS AS NEEDED
Status: DISCONTINUED | OUTPATIENT
Start: 2022-06-14 | End: 2022-06-14

## 2022-06-14 RX ORDER — METOCLOPRAMIDE HYDROCHLORIDE 5 MG/ML
10 INJECTION INTRAMUSCULAR; INTRAVENOUS EVERY 6 HOURS PRN
Status: DISCONTINUED | OUTPATIENT
Start: 2022-06-14 | End: 2022-06-15 | Stop reason: HOSPADM

## 2022-06-14 RX ORDER — ONDANSETRON 2 MG/ML
4 INJECTION INTRAMUSCULAR; INTRAVENOUS ONCE AS NEEDED
Status: COMPLETED | OUTPATIENT
Start: 2022-06-14 | End: 2022-06-14

## 2022-06-14 RX ORDER — SCOLOPAMINE TRANSDERMAL SYSTEM 1 MG/1
1 PATCH, EXTENDED RELEASE TRANSDERMAL ONCE
Status: DISCONTINUED | OUTPATIENT
Start: 2022-06-14 | End: 2022-06-14

## 2022-06-14 RX ORDER — OXYCODONE HCL 5 MG/5 ML
10 SOLUTION, ORAL ORAL EVERY 4 HOURS PRN
Status: DISCONTINUED | OUTPATIENT
Start: 2022-06-14 | End: 2022-06-15 | Stop reason: HOSPADM

## 2022-06-14 RX ORDER — ACETAMINOPHEN 325 MG/1
975 TABLET ORAL ONCE
Status: COMPLETED | OUTPATIENT
Start: 2022-06-14 | End: 2022-06-14

## 2022-06-14 RX ORDER — PROMETHAZINE HYDROCHLORIDE 25 MG/ML
25 INJECTION, SOLUTION INTRAMUSCULAR; INTRAVENOUS EVERY 6 HOURS PRN
Status: DISCONTINUED | OUTPATIENT
Start: 2022-06-14 | End: 2022-06-15 | Stop reason: HOSPADM

## 2022-06-14 RX ORDER — HYDROMORPHONE HCL/PF 1 MG/ML
0.5 SYRINGE (ML) INJECTION
Status: DISCONTINUED | OUTPATIENT
Start: 2022-06-14 | End: 2022-06-14 | Stop reason: HOSPADM

## 2022-06-14 RX ORDER — SODIUM CHLORIDE, SODIUM LACTATE, POTASSIUM CHLORIDE, CALCIUM CHLORIDE 600; 310; 30; 20 MG/100ML; MG/100ML; MG/100ML; MG/100ML
125 INJECTION, SOLUTION INTRAVENOUS CONTINUOUS
Status: DISCONTINUED | OUTPATIENT
Start: 2022-06-14 | End: 2022-06-14

## 2022-06-14 RX ORDER — PROPOFOL 10 MG/ML
INJECTION, EMULSION INTRAVENOUS AS NEEDED
Status: DISCONTINUED | OUTPATIENT
Start: 2022-06-14 | End: 2022-06-14

## 2022-06-14 RX ORDER — BUPIVACAINE HYDROCHLORIDE 5 MG/ML
INJECTION, SOLUTION PERINEURAL AS NEEDED
Status: DISCONTINUED | OUTPATIENT
Start: 2022-06-14 | End: 2022-06-14 | Stop reason: HOSPADM

## 2022-06-14 RX ORDER — ENOXAPARIN SODIUM 100 MG/ML
40 INJECTION SUBCUTANEOUS DAILY
Status: DISCONTINUED | OUTPATIENT
Start: 2022-06-15 | End: 2022-06-15 | Stop reason: HOSPADM

## 2022-06-14 RX ORDER — ONDANSETRON 2 MG/ML
4 INJECTION INTRAMUSCULAR; INTRAVENOUS EVERY 6 HOURS PRN
Status: DISCONTINUED | OUTPATIENT
Start: 2022-06-14 | End: 2022-06-15 | Stop reason: HOSPADM

## 2022-06-14 RX ORDER — LIDOCAINE HYDROCHLORIDE 10 MG/ML
INJECTION, SOLUTION EPIDURAL; INFILTRATION; INTRACAUDAL; PERINEURAL AS NEEDED
Status: DISCONTINUED | OUTPATIENT
Start: 2022-06-14 | End: 2022-06-14

## 2022-06-14 RX ORDER — MAGNESIUM HYDROXIDE 1200 MG/15ML
LIQUID ORAL AS NEEDED
Status: DISCONTINUED | OUTPATIENT
Start: 2022-06-14 | End: 2022-06-14 | Stop reason: HOSPADM

## 2022-06-14 RX ORDER — ACETAMINOPHEN 160 MG/5ML
975 SUSPENSION, ORAL (FINAL DOSE FORM) ORAL EVERY 8 HOURS
Status: DISCONTINUED | OUTPATIENT
Start: 2022-06-14 | End: 2022-06-15 | Stop reason: HOSPADM

## 2022-06-14 RX ORDER — ACETAMINOPHEN 325 MG/1
975 TABLET ORAL EVERY 8 HOURS
Status: DISCONTINUED | OUTPATIENT
Start: 2022-06-14 | End: 2022-06-15 | Stop reason: HOSPADM

## 2022-06-14 RX ORDER — SIMETHICONE 80 MG
80 TABLET,CHEWABLE ORAL 4 TIMES DAILY PRN
Status: DISCONTINUED | OUTPATIENT
Start: 2022-06-14 | End: 2022-06-15 | Stop reason: HOSPADM

## 2022-06-14 RX ORDER — DIPHENHYDRAMINE HCL 25 MG
25 TABLET ORAL
Status: DISCONTINUED | OUTPATIENT
Start: 2022-06-14 | End: 2022-06-15 | Stop reason: HOSPADM

## 2022-06-14 RX ORDER — CEFAZOLIN SODIUM 2 G/50ML
2000 SOLUTION INTRAVENOUS ONCE
Status: COMPLETED | OUTPATIENT
Start: 2022-06-14 | End: 2022-06-14

## 2022-06-14 RX ORDER — GABAPENTIN 300 MG/1
600 CAPSULE ORAL ONCE
Status: COMPLETED | OUTPATIENT
Start: 2022-06-14 | End: 2022-06-14

## 2022-06-14 RX ORDER — LEVOTHYROXINE SODIUM 0.12 MG/1
125 TABLET ORAL DAILY
Status: DISCONTINUED | OUTPATIENT
Start: 2022-06-15 | End: 2022-06-15 | Stop reason: HOSPADM

## 2022-06-14 RX ORDER — GABAPENTIN 300 MG/1
300 CAPSULE ORAL EVERY 8 HOURS
Status: DISCONTINUED | OUTPATIENT
Start: 2022-06-14 | End: 2022-06-15 | Stop reason: HOSPADM

## 2022-06-14 RX ORDER — CEFAZOLIN SODIUM 2 G/50ML
2000 SOLUTION INTRAVENOUS EVERY 8 HOURS
Status: COMPLETED | OUTPATIENT
Start: 2022-06-14 | End: 2022-06-15

## 2022-06-14 RX ORDER — FENTANYL CITRATE/PF 50 MCG/ML
25 SYRINGE (ML) INJECTION
Status: DISCONTINUED | OUTPATIENT
Start: 2022-06-14 | End: 2022-06-14 | Stop reason: HOSPADM

## 2022-06-14 RX ORDER — NEOSTIGMINE METHYLSULFATE 1 MG/ML
INJECTION INTRAVENOUS AS NEEDED
Status: DISCONTINUED | OUTPATIENT
Start: 2022-06-14 | End: 2022-06-14

## 2022-06-14 RX ORDER — ENOXAPARIN SODIUM 100 MG/ML
40 INJECTION SUBCUTANEOUS
Status: COMPLETED | OUTPATIENT
Start: 2022-06-14 | End: 2022-06-14

## 2022-06-14 RX ORDER — DEXAMETHASONE SODIUM PHOSPHATE 10 MG/ML
INJECTION, SOLUTION INTRAMUSCULAR; INTRAVENOUS AS NEEDED
Status: DISCONTINUED | OUTPATIENT
Start: 2022-06-14 | End: 2022-06-14

## 2022-06-14 RX ORDER — ROCURONIUM BROMIDE 10 MG/ML
INJECTION, SOLUTION INTRAVENOUS AS NEEDED
Status: DISCONTINUED | OUTPATIENT
Start: 2022-06-14 | End: 2022-06-14

## 2022-06-14 RX ORDER — MEPERIDINE HYDROCHLORIDE 25 MG/ML
12.5 INJECTION INTRAMUSCULAR; INTRAVENOUS; SUBCUTANEOUS
Status: DISCONTINUED | OUTPATIENT
Start: 2022-06-14 | End: 2022-06-14 | Stop reason: HOSPADM

## 2022-06-14 RX ORDER — SODIUM CHLORIDE, SODIUM LACTATE, POTASSIUM CHLORIDE, CALCIUM CHLORIDE 600; 310; 30; 20 MG/100ML; MG/100ML; MG/100ML; MG/100ML
75 INJECTION, SOLUTION INTRAVENOUS CONTINUOUS
Status: DISCONTINUED | OUTPATIENT
Start: 2022-06-14 | End: 2022-06-15 | Stop reason: HOSPADM

## 2022-06-14 RX ORDER — ONDANSETRON 2 MG/ML
INJECTION INTRAMUSCULAR; INTRAVENOUS AS NEEDED
Status: DISCONTINUED | OUTPATIENT
Start: 2022-06-14 | End: 2022-06-14

## 2022-06-14 RX ORDER — MIDAZOLAM HYDROCHLORIDE 2 MG/2ML
INJECTION, SOLUTION INTRAMUSCULAR; INTRAVENOUS AS NEEDED
Status: DISCONTINUED | OUTPATIENT
Start: 2022-06-14 | End: 2022-06-14

## 2022-06-14 RX ORDER — FENTANYL CITRATE 50 UG/ML
INJECTION, SOLUTION INTRAMUSCULAR; INTRAVENOUS AS NEEDED
Status: DISCONTINUED | OUTPATIENT
Start: 2022-06-14 | End: 2022-06-14

## 2022-06-14 RX ADMIN — MIDAZOLAM 4 MG: 1 INJECTION INTRAMUSCULAR; INTRAVENOUS at 07:28

## 2022-06-14 RX ADMIN — GLYCOPYRROLATE 0.4 MG: 0.2 INJECTION, SOLUTION INTRAMUSCULAR; INTRAVENOUS at 09:14

## 2022-06-14 RX ADMIN — ACETAMINOPHEN 975 MG: 325 TABLET, FILM COATED ORAL at 23:33

## 2022-06-14 RX ADMIN — GABAPENTIN 300 MG: 300 CAPSULE ORAL at 23:35

## 2022-06-14 RX ADMIN — SCOPALAMINE 1 PATCH: 1 PATCH, EXTENDED RELEASE TRANSDERMAL at 05:38

## 2022-06-14 RX ADMIN — ACETAMINOPHEN 975 MG: 325 TABLET, FILM COATED ORAL at 05:39

## 2022-06-14 RX ADMIN — ONDANSETRON 4 MG: 2 INJECTION INTRAMUSCULAR; INTRAVENOUS at 10:05

## 2022-06-14 RX ADMIN — PROMETHAZINE HYDROCHLORIDE 25 MG: 25 INJECTION INTRAMUSCULAR; INTRAVENOUS at 11:07

## 2022-06-14 RX ADMIN — ROCURONIUM BROMIDE 20 MG: 50 INJECTION, SOLUTION INTRAVENOUS at 07:47

## 2022-06-14 RX ADMIN — ONDANSETRON 4 MG: 2 INJECTION INTRAMUSCULAR; INTRAVENOUS at 07:30

## 2022-06-14 RX ADMIN — ACETAMINOPHEN 975 MG: 325 SUSPENSION ORAL at 15:03

## 2022-06-14 RX ADMIN — CEFAZOLIN SODIUM 2000 MG: 2 SOLUTION INTRAVENOUS at 15:06

## 2022-06-14 RX ADMIN — ENOXAPARIN SODIUM 40 MG: 40 INJECTION SUBCUTANEOUS at 05:57

## 2022-06-14 RX ADMIN — SODIUM CHLORIDE, SODIUM LACTATE, POTASSIUM CHLORIDE, AND CALCIUM CHLORIDE 125 ML/HR: .6; .31; .03; .02 INJECTION, SOLUTION INTRAVENOUS at 05:56

## 2022-06-14 RX ADMIN — FENTANYL CITRATE 50 MCG: 50 INJECTION INTRAMUSCULAR; INTRAVENOUS at 07:30

## 2022-06-14 RX ADMIN — SODIUM CHLORIDE, SODIUM LACTATE, POTASSIUM CHLORIDE, AND CALCIUM CHLORIDE: .6; .31; .03; .02 INJECTION, SOLUTION INTRAVENOUS at 09:18

## 2022-06-14 RX ADMIN — FENTANYL CITRATE 50 MCG: 50 INJECTION INTRAMUSCULAR; INTRAVENOUS at 08:17

## 2022-06-14 RX ADMIN — FAMOTIDINE 20 MG: 10 INJECTION INTRAVENOUS at 12:19

## 2022-06-14 RX ADMIN — CEFAZOLIN SODIUM 2000 MG: 2 SOLUTION INTRAVENOUS at 23:37

## 2022-06-14 RX ADMIN — HYDROMORPHONE HYDROCHLORIDE 0.5 MG: 2 INJECTION INTRAMUSCULAR; INTRAVENOUS; SUBCUTANEOUS at 09:03

## 2022-06-14 RX ADMIN — FENTANYL CITRATE 25 MCG: 50 INJECTION, SOLUTION INTRAMUSCULAR; INTRAVENOUS at 10:31

## 2022-06-14 RX ADMIN — NEOSTIGMINE METHYLSULFATE 3 MG: 1 INJECTION INTRAVENOUS at 09:14

## 2022-06-14 RX ADMIN — FENTANYL CITRATE 25 MCG: 50 INJECTION, SOLUTION INTRAMUSCULAR; INTRAVENOUS at 10:02

## 2022-06-14 RX ADMIN — CEFAZOLIN SODIUM 2000 MG: 2 SOLUTION INTRAVENOUS at 07:21

## 2022-06-14 RX ADMIN — ONDANSETRON 4 MG: 2 INJECTION INTRAMUSCULAR; INTRAVENOUS at 12:19

## 2022-06-14 RX ADMIN — DEXAMETHASONE SODIUM PHOSPHATE 10 MG: 10 INJECTION INTRAMUSCULAR; INTRAVENOUS at 07:30

## 2022-06-14 RX ADMIN — PROPOFOL 200 MG: 10 INJECTION, EMULSION INTRAVENOUS at 07:30

## 2022-06-14 RX ADMIN — FAMOTIDINE 20 MG: 10 INJECTION INTRAVENOUS at 20:25

## 2022-06-14 RX ADMIN — ROCURONIUM BROMIDE 50 MG: 50 INJECTION, SOLUTION INTRAVENOUS at 07:30

## 2022-06-14 RX ADMIN — CELECOXIB 200 MG: 200 CAPSULE ORAL at 05:40

## 2022-06-14 RX ADMIN — FENTANYL CITRATE 25 MCG: 50 INJECTION, SOLUTION INTRAMUSCULAR; INTRAVENOUS at 10:11

## 2022-06-14 RX ADMIN — FENTANYL CITRATE 50 MCG: 50 INJECTION INTRAMUSCULAR; INTRAVENOUS at 08:05

## 2022-06-14 RX ADMIN — SODIUM CHLORIDE, SODIUM LACTATE, POTASSIUM CHLORIDE, AND CALCIUM CHLORIDE: .6; .31; .03; .02 INJECTION, SOLUTION INTRAVENOUS at 07:53

## 2022-06-14 RX ADMIN — LIDOCAINE HYDROCHLORIDE 100 MG: 10 INJECTION, SOLUTION EPIDURAL; INFILTRATION; INTRACAUDAL; PERINEURAL at 07:30

## 2022-06-14 RX ADMIN — GABAPENTIN 300 MG: 300 CAPSULE ORAL at 15:03

## 2022-06-14 RX ADMIN — GABAPENTIN 600 MG: 300 CAPSULE ORAL at 05:40

## 2022-06-14 NOTE — INTERVAL H&P NOTE
H&P reviewed  After examining the patient I find no changes in the patients condition since the H&P had been written      Vitals:    06/14/22 0547   BP: 111/56   Pulse: 82   Resp: 14   Temp: 98 °F (36 7 °C)   SpO2: 96%

## 2022-06-14 NOTE — PLAN OF CARE
Problem: PAIN - ADULT  Goal: Verbalizes/displays adequate comfort level or baseline comfort level  Description: Interventions:  - Encourage patient to monitor pain and request assistance  - Assess pain using appropriate pain scale  - Administer analgesics based on type and severity of pain and evaluate response  - Implement non-pharmacological measures as appropriate and evaluate response  - Consider cultural and social influences on pain and pain management  - Notify physician/advanced practitioner if interventions unsuccessful or patient reports new pain  6/14/2022 1240 by Butch Rogers RN  Outcome: Progressing  6/14/2022 1239 by Butch Rogers RN  Outcome: Progressing     Problem: INFECTION - ADULT  Goal: Absence or prevention of progression during hospitalization  Description: INTERVENTIONS:  - Assess and monitor for signs and symptoms of infection  - Monitor lab/diagnostic results  - Monitor all insertion sites, i e  indwelling lines, tubes, and drains  - Monitor endotracheal if appropriate and nasal secretions for changes in amount and color  - [place] appropriate cooling/warming therapies per order  - Administer medications as ordered  - Instruct and encourage patient and family to use good hand hygiene technique  - Identify and instruct in appropriate isolation precautions for identified infection/condition  6/14/2022 1240 by Butch Rogers RN  Outcome: Progressing  6/14/2022 1239 by Butch Rogers RN  Outcome: Progressing  Goal: Absence of fever/infection during neutropenic period  Description: INTERVENTIONS:  - Monitor WBC    6/14/2022 1240 by Butch Rogers RN  Outcome: Progressing  6/14/2022 1239 by Butch Rogers RN  Outcome: Progressing     Problem: DISCHARGE PLANNING  Goal: Discharge to home or other facility with appropriate resources  Description: INTERVENTIONS:  - Identify barriers to discharge w/patient and caregiver  - Arrange for needed discharge resources and transportation as appropriate  - Identify discharge learning needs (meds, wound care, etc )  - Arrange for interpretive services to assist at discharge as needed  - Refer to Case Management Department for coordinating discharge planning if the patient needs post-hospital services based on physician/advanced practitioner order or complex needs related to functional status, cognitive ability, or social support system  6/14/2022 1240 by Ramin Arnold RN  Outcome: Progressing  6/14/2022 1239 by Ramin Arnold RN  Outcome: Progressing     Problem: Knowledge Deficit  Goal: Patient/family/caregiver demonstrates understanding of disease process, treatment plan, medications, and discharge instructions  Description: Complete learning assessment and assess knowledge base    Interventions:  - Provide teaching at level of understanding  - Provide teaching via preferred learning methods  6/14/2022 1240 by Ramin Arnold RN  Outcome: Progressing  6/14/2022 1239 by Ramin Arnold RN  Outcome: Progressing     Problem: GASTROINTESTINAL - ADULT  Goal: Minimal or absence of nausea and/or vomiting  Description: INTERVENTIONS:  - Administer IV fluids if ordered to ensure adequate hydration  - Maintain NPO status until nausea and vomiting are resolved  - Nasogastric tube if ordered  - Administer ordered antiemetic medications as needed  - Provide nonpharmacologic comfort measures as appropriate  - Advance diet as tolerated, if ordered  - Consider nutrition services referral to assist patient with adequate nutrition and appropriate food choices  6/14/2022 1240 by Ramin Arnold RN  Outcome: Progressing  6/14/2022 1239 by Ramin Arnold RN  Outcome: Progressing  Goal: Maintains adequate nutritional intake  Description: INTERVENTIONS:  - Monitor percentage of each meal consumed  - Identify factors contributing to decreased intake, treat as appropriate  - Assist with meals as needed  - Monitor I&O, weight, and lab values if indicated  - Obtain nutrition services referral as needed  6/14/2022 1240 by Lovely Demarco RN  Outcome: Progressing  6/14/2022 1239 by Lovely Demarco RN  Outcome: Progressing     Problem: METABOLIC, FLUID AND ELECTROLYTES - ADULT  Goal: Electrolytes maintained within normal limits  Description: INTERVENTIONS:  - Monitor labs and assess patient for signs and symptoms of electrolyte imbalances  - Administer electrolyte replacement as ordered  - Monitor response to electrolyte replacements, including repeat lab results as appropriate  - Instruct patient on fluid and nutrition as appropriate  6/14/2022 1240 by Lovely Demarco RN  Outcome: Progressing  6/14/2022 1239 by Lovely Demarco RN  Outcome: Progressing  Goal: Fluid balance maintained  Description: INTERVENTIONS:  - Monitor labs   - Monitor I/O and WT  - Instruct patient on fluid and nutrition as appropriate  - Assess for signs & symptoms of volume excess or deficit  6/14/2022 1240 by Lovely Demarco RN  Outcome: Progressing  6/14/2022 1239 by Lovely Demarco RN  Outcome: Progressing

## 2022-06-14 NOTE — OP NOTE
OPERATIVE REPORT  PATIENT NAME: Logan Campos    :  1990  MRN: [id number]  Pt Location: [facility] OR ROOM 08    SURGERY DATE: 2022    Surgeon(s) and Role:     * Aury Blunt MD - Primary     * Galvin Krabbe, DO - Assisting    Preop Diagnosis:  Morbid obesity ([facility] ) [E66 01]    Post-Op Diagnosis Codes:     * Morbid obesity ([facility] ) [E66 01]    Procedure(s) (LRB):  LAP SLEEVE GASTRECTOMY &INTRAOP EGD W/ ROBOT (N/A)    Specimen(s):  ID Type Source Tests Collected by Time Destination   1 : PORTION OF STOMACH Tissue Stomach TISSUE EXAM Aury Blunt MD 2022 [id number]        Estimated Blood Loss:   20 ml    Drains:  * No LDAs found *    Anesthesia Type:   General    Operative Indications: Morbid obesity ([facility] ) [E66 01]      Operative Findings:  Normal Findings    Complications:   None    Procedure and Technique:  Robotic Sleeve gastrectomy   I was present for the entire procedure    Patient Disposition:  hemodynamically stable     No qualified resident was available  Assistant was necessary for instrument exchange and counter traction and assistance with stapling     Indication:   Patient suffers from morbid obesity and associated co-morbidities  and failed to achieve any meaningful or sustainable weight loss and was therefore consented to undergo a laparoscopic sleeve gastrectomy  Risks and benefits were explained to the patient, patient consented for the procedure  Procedure: The patient was brought to the operating room and placed in a supine position  The patient received a dose of IV antibiotics and a dose of subcutaneous Lovenox prior to the procedure  The patient was induced under general endotracheal anesthesia  The abdominal wall was prepped and draped under sterile conditions in the usual fashion   The procedure was started by obtaining access to the abdominal cavity using a Veress needle to the left side of the midline around 6 inches from the xiphoid the abdominal cavity was insufflated with CO2 to a pressure of 15 mmHg  After that, the abdomen was entered with an 8 mm trocar using an Optiview trocar under direct visualization  At that point, an 8 and 12 mm robotic trocars were placed on the right side of the abdominal wall, under direct visualization, and another 8 mm robotic trocar and 12 mm assistant port were placed on the left side of the abdominal wall, also under direct visualization  A TAP block was performed using 20 ml of Exparel mixed with saline and 0 5 % Marcaine for a total of 100 ml  The patient was then placed in a reverse Trendelenburg position  A Sergio retractor was placed through a small stab incision below the xiphoid and was used to retract the left lobe of the liver in a medial fashion  The robot was then brought into the surgical field and the arms docked  An OG tube was placed to decompress the stomach and then removed immediately  The angle of His was then dissected using the vessel sealer  At that point, we turned our attention to the pylorus and using the vessel sealerl, the gastrocolic ligament was taken down starting 4 cm proximal to the pylorus, all the way up to the level of the short gastric vessels which were taken down with the vessel sealer  as well  The angle of His was also dissected and all the posterior attachments of the fundus were taken down with the vessel sealer, the spleen was kept out of harms way and the left velasquez was exposed and the stomach was completely mobilized off the left velasquez and rotated medially  At that point, the anesthesiologist was asked to insert a 36-[place] Bougie  The Bougie was secured in place by the anesthesiologist      We started transecting the stomach using a green 60 mm SureForm cartridge and the rest of the firings consisted of blue loads  The 36-[place] Bougie was kept in place throughout the performance of the sleeve gastrectomy   We made particular attention during the transaction of the greater curvature to retract the stomach laterally at the site of the transected vessels  to prevent corkscrewing of the gastric sleeve  We also avoided getting too close to the incisura to prevent  narrowing at the level of the incisura  The staple line was hemostatic and well formed and there was no evidence of narrowing at the incisura  The staple line was then imbricated using 2-0 V LOC suture in a running fashion while the bougie is in place  At the end, the anesthesiologist was asked to remove the [address]  The surgical field was inspected one more time and appeared hemostatic  We then removed the [facility] liver retractor  The 12 mm port was extended, and then it was dilated  After that, the stomach specimen was retrieved and sent to Pathology  Sponge count was completed and was correct  The 12 mm port was then closed using #1 Vicryl in a simple fashion  All the trocars were removed under direct visualization, and the skin edges were approximated using 4-0 Monocryl in an interrupted, inverted fashion  Histoacryl was then applied to the skin incisions        The patient was extubated and transferred to the PACU in stable condition      SIGNATURE: Halle Sparks MD  DATE: June 14, 2022  TIME: 9:12 AM

## 2022-06-14 NOTE — ANESTHESIA POSTPROCEDURE EVALUATION
Post-Op Assessment Note    CV Status:  Stable    Pain management: adequate     Mental Status:  Alert and awake   Hydration Status:  Euvolemic   PONV Controlled:  Controlled   Airway Patency:  Patent      Post Op Vitals Reviewed: Yes      Staff: Anesthesiologist         No complications documented      /63 (06/14/22 1100)    Temp      Pulse (!) 54 (06/14/22 1100)   Resp 16 (06/14/22 1100)    SpO2 96 % (06/14/22 1100)

## 2022-06-15 VITALS
DIASTOLIC BLOOD PRESSURE: 62 MMHG | HEART RATE: 46 BPM | TEMPERATURE: 98 F | HEIGHT: 63 IN | BODY MASS INDEX: 41.52 KG/M2 | OXYGEN SATURATION: 93 % | SYSTOLIC BLOOD PRESSURE: 107 MMHG | RESPIRATION RATE: 18 BRPM | WEIGHT: 234.35 LBS

## 2022-06-15 PROBLEM — Z98.84 BARIATRIC SURGERY STATUS: Status: ACTIVE | Noted: 2022-06-15

## 2022-06-15 LAB
ANION GAP SERPL CALCULATED.3IONS-SCNC: 6 MMOL/L (ref 4–13)
BUN SERPL-MCNC: 12 MG/DL (ref 5–25)
CALCIUM SERPL-MCNC: 9 MG/DL (ref 8.3–10.1)
CHLORIDE SERPL-SCNC: 104 MMOL/L (ref 100–108)
CO2 SERPL-SCNC: 28 MMOL/L (ref 21–32)
CREAT SERPL-MCNC: 1.01 MG/DL (ref 0.6–1.3)
ERYTHROCYTE [DISTWIDTH] IN BLOOD BY AUTOMATED COUNT: 13.9 % (ref 11.6–15.1)
GFR SERPL CREATININE-BSD FRML MDRD: 73 ML/MIN/1.73SQ M
GLUCOSE SERPL-MCNC: 102 MG/DL (ref 65–140)
HCT VFR BLD AUTO: 33.3 % (ref 34.8–46.1)
HGB BLD-MCNC: 10.9 G/DL (ref 11.5–15.4)
MCH RBC QN AUTO: 28.8 PG (ref 26.8–34.3)
MCHC RBC AUTO-ENTMCNC: 32.7 G/DL (ref 31.4–37.4)
MCV RBC AUTO: 88 FL (ref 82–98)
PLATELET # BLD AUTO: 273 THOUSANDS/UL (ref 149–390)
PMV BLD AUTO: 10 FL (ref 8.9–12.7)
POTASSIUM SERPL-SCNC: 3.9 MMOL/L (ref 3.5–5.3)
RBC # BLD AUTO: 3.78 MILLION/UL (ref 3.81–5.12)
SODIUM SERPL-SCNC: 138 MMOL/L (ref 136–145)
WBC # BLD AUTO: 11.73 THOUSAND/UL (ref 4.31–10.16)

## 2022-06-15 PROCEDURE — 85027 COMPLETE CBC AUTOMATED: CPT | Performed by: SURGERY

## 2022-06-15 PROCEDURE — NC001 PR NO CHARGE: Performed by: SURGERY

## 2022-06-15 PROCEDURE — 80048 BASIC METABOLIC PNL TOTAL CA: CPT | Performed by: SURGERY

## 2022-06-15 PROCEDURE — 99024 POSTOP FOLLOW-UP VISIT: CPT | Performed by: SURGERY

## 2022-06-15 RX ORDER — ACETAMINOPHEN 160 MG/5ML
975 SUSPENSION, ORAL (FINAL DOSE FORM) ORAL EVERY 8 HOURS
Qty: 638.4 ML | Refills: 0 | Status: SHIPPED | OUTPATIENT
Start: 2022-06-15 | End: 2022-06-22

## 2022-06-15 RX ADMIN — ENOXAPARIN SODIUM 40 MG: 40 INJECTION SUBCUTANEOUS at 09:19

## 2022-06-15 RX ADMIN — FAMOTIDINE 20 MG: 10 INJECTION INTRAVENOUS at 09:19

## 2022-06-15 RX ADMIN — ACETAMINOPHEN 975 MG: 325 TABLET, FILM COATED ORAL at 06:36

## 2022-06-15 RX ADMIN — GABAPENTIN 300 MG: 300 CAPSULE ORAL at 06:36

## 2022-06-15 NOTE — UTILIZATION REVIEW
Initial Clinical Review    Elective    IP   surgical procedure    Age/Sex: 33 y o  female     Surgery Date:    6/14/22    Procedure: LAP SLEEVE GASTRECTOMY &INTRAOP EGD W/ ROBOT (N/A)    Anesthesia:     general    Operative Findings:    normal    POD#1 Progress Note:   Continue  Post op care  Continue pain control/antiemetics as needed  Encourage ambulation/incentive spirometry  Tolerating liquid diet       Admission Orders: Date/Time/Statement:   Admission Orders (From admission, onward)     Ordered        06/14/22 1015  Inpatient Admission  Once                      Orders Placed This Encounter   Procedures    Inpatient Admission     Standing Status:   Standing     Number of Occurrences:   1     Order Specific Question:   Level of Care     Answer:   Med Surg [16]     Order Specific Question:   Bed Type     Answer:   Bariatric [1]     Order Specific Question:   Estimated length of stay     Answer:   Inpatient Only Surgery     Vital Signs: /62   Pulse (!) 46   Temp 98 °F (36 7 °C)   Resp 18   Ht 5' 3" (1 6 m)   Wt 106 kg (234 lb 5 6 oz) Comment: clothing on  LMP 05/29/2022   SpO2 93%   BMI 41 51 kg/m²     Pertinent Labs/Diagnostic Test Results:     Results from last 7 days   Lab Units 06/15/22  0549   WBC Thousand/uL 11 73*   HEMOGLOBIN g/dL 10 9*   HEMATOCRIT % 33 3*   PLATELETS Thousands/uL 273         Results from last 7 days   Lab Units 06/15/22  0549   SODIUM mmol/L 138   POTASSIUM mmol/L 3 9   CHLORIDE mmol/L 104   CO2 mmol/L 28   ANION GAP mmol/L 6   BUN mg/dL 12   CREATININE mg/dL 1 01   EGFR ml/min/1 73sq m 73   CALCIUM mg/dL 9 0             Results from last 7 days   Lab Units 06/15/22  0549   GLUCOSE RANDOM mg/dL 102         Diet:   Bariatric cl liq    Mobility:    OOB as  tolerated    DVT Prophylaxis:    SCD'S    Medications/Pain Control:   Scheduled Medications:  acetaminophen, 975 mg, Oral, Q8H   Or  acetaminophen, 975 mg, Oral, Q8H  enoxaparin, 40 mg, Subcutaneous, Daily  famotidine, 20 mg, Intravenous, Q12H BHAVESH  gabapentin, 300 mg, Oral, Q8H  levothyroxine, 125 mcg, Oral, Daily  sertraline, 50 mg, Oral, Daily      Continuous IV Infusions:  lactated ringers, 75 mL/hr, Intravenous, Continuous      PRN Meds:  diphenhydrAMINE, 25 mg, Oral, HS PRN  metoclopramide, 10 mg, Intravenous, Q6H PRN  morphine injection, 2 mg, Intravenous, Q4H PRN  ondansetron, 4 mg, Intravenous, Q6H PRN  oxyCODONE, 10 mg, Oral, Q4H PRN  oxyCODONE, 5 mg, Oral, Q4H PRN  phenol, 1 spray, Mouth/Throat, Q2H PRN  promethazine, 25 mg, Intramuscular, Q6H PRN  simethicone, 80 mg, Oral, 4x Daily PRN        Network Utilization Review Department  ATTENTION: Please call with any questions or concerns to [phone] and carefully listen to the prompts so that you are directed to the right person  All voicemails are confidential   Jacob Garcia all requests for admission clinical reviews, approved or denied determinations and any other requests to dedicated fax number below belonging to the campus where the patient is receiving treatment   List of dedicated fax numbers for the Facilities:  [address] DENIALS (Administrative/Medical Necessity) [phone]   [address] (Maternity/NICU/Pediatrics) [phone]   [address]  [address] [facility] [place] [phone] [place] [address] [address] [facility] [phone]

## 2022-06-15 NOTE — DISCHARGE SUMMARY
Discharge Summary - Zahraa Gu 33 y o  female MRN: [id number]    Unit/Bed#: E5 -01 Encounter: [id number]      Pre-Operative Diagnosis: Pre-Op Diagnosis Codes:     * Morbid obesity ([address] ) [E66 01]    Post-Operative Diagnosis: Post-Op Diagnosis Codes:     * Morbid obesity ([facility] [address] ) [E66 01]    Procedures Performed:  Procedure(s):  LAP SLEEVE GASTRECTOMY W/ ROBOT    Surgeon: Clifford Domingo MD    See H & P for full details of admission and Operative Note for full details of operations performed  Patient tolerated surgery well without complications  In the morning postoperative Day 1, the patient had mild nausea and abdominal pain  Tolerated a clear liquid diet without vomiting  Able to ambulate and voiding independently  Patient was deemed ready for discharge home  Patient was seen and examined prior to discharge  Provisions for Follow-Up Care:  See After Visit Summary/Discharge Instructions for information related to follow-up care and home orders  Disposition: Home, in stable condition  Planned Readmission: No    Discharge Medications:  See After Visit Summary/Discharge Instructions for reconciled discharge medications provided to patient and family  Post Operative instructions: Reviewed with patient and/or family      Signature:   Magaly January, DO  Date: 6/15/2022 Time: 11:04 AM

## 2022-06-15 NOTE — PLAN OF CARE
Problem: PAIN - ADULT  Goal: Verbalizes/displays adequate comfort level or baseline comfort level  Description: Interventions:  - Encourage patient to monitor pain and request assistance  - Assess pain using appropriate pain scale  - Administer analgesics based on type and severity of pain and evaluate response  - Implement non-pharmacological measures as appropriate and evaluate response  - Consider cultural and social influences on pain and pain management  - Notify physician/advanced practitioner if interventions unsuccessful or patient reports new pain  Outcome: Progressing     Problem: INFECTION - ADULT  Goal: Absence or prevention of progression during hospitalization  Description: INTERVENTIONS:  - Assess and monitor for signs and symptoms of infection  - Monitor lab/diagnostic results  - Monitor all insertion sites, i e  indwelling lines, tubes, and drains  - Monitor endotracheal if appropriate and nasal secretions for changes in amount and color  - [place] appropriate cooling/warming therapies per order  - Administer medications as ordered  - Instruct and encourage patient and family to use good hand hygiene technique  - Identify and instruct in appropriate isolation precautions for identified infection/condition  Outcome: Progressing  Goal: Absence of fever/infection during neutropenic period  Description: INTERVENTIONS:  - Monitor WBC    Outcome: Progressing     Problem: SAFETY ADULT  Goal: Patient will remain free of falls  Description: INTERVENTIONS:  - Educate patient/family on patient safety including physical limitations  - Instruct patient to call for assistance with activity   - Consult OT/PT to assist with strengthening/mobility   - Keep Call bell within reach  - Keep bed low and locked with side rails adjusted as appropriate  - Keep care items and personal belongings within reach  - Initiate and maintain comfort rounds  - Make Fall Risk Sign visible to staff  - Apply yellow socks and bracelet for high fall risk patients  - Consider moving patient to room near nurses station  Outcome: Progressing  Goal: Maintain or return to baseline ADL function  Description: INTERVENTIONS:  -  Assess patient's ability to carry out ADLs; assess patient's baseline for ADL function and identify physical deficits which impact ability to perform ADLs (bathing, care of mouth/teeth, toileting, grooming, dressing, etc )  - Assess/evaluate cause of self-care deficits   - Assess range of motion  - Assess patient's mobility; develop plan if impaired  - Assess patient's need for assistive devices and provide as appropriate  - Encourage maximum independence but intervene and supervise when necessary  - Involve family in performance of ADLs  - Assess for home care needs following discharge   - Consider OT consult to assist with ADL evaluation and planning for discharge  - Provide patient education as appropriate  Outcome: Progressing  Goal: Maintains/Returns to pre admission functional level  Description: INTERVENTIONS:  - Perform BMAT or MOVE assessment daily    - Set and communicate daily mobility goal to care team and patient/family/caregiver     - Collaborate with rehabilitation services on mobility goals if consulted  - Out of bed for toileting  - Record patient progress and toleration of activity level   Outcome: Progressing     Problem: DISCHARGE PLANNING  Goal: Discharge to home or other facility with appropriate resources  Description: INTERVENTIONS:  - Identify barriers to discharge w/patient and caregiver  - Arrange for needed discharge resources and transportation as appropriate  - Identify discharge learning needs (meds, wound care, etc )  - Arrange for interpretive services to assist at discharge as needed  - Refer to Case Management Department for coordinating discharge planning if the patient needs post-hospital services based on physician/advanced practitioner order or complex needs related to functional status, cognitive ability, or social support system  Outcome: Progressing     Problem: Knowledge Deficit  Goal: Patient/family/caregiver demonstrates understanding of disease process, treatment plan, medications, and discharge instructions  Description: Complete learning assessment and assess knowledge base    Interventions:  - Provide teaching at level of understanding  - Provide teaching via preferred learning methods  Outcome: Progressing     Problem: GASTROINTESTINAL - ADULT  Goal: Minimal or absence of nausea and/or vomiting  Description: INTERVENTIONS:  - Administer IV fluids if ordered to ensure adequate hydration  - Maintain NPO status until nausea and vomiting are resolved  - Nasogastric tube if ordered  - Administer ordered antiemetic medications as needed  - Provide nonpharmacologic comfort measures as appropriate  - Advance diet as tolerated, if ordered  - Consider nutrition services referral to assist patient with adequate nutrition and appropriate food choices  Outcome: Progressing  Goal: Maintains adequate nutritional intake  Description: INTERVENTIONS:  - Monitor percentage of each meal consumed  - Identify factors contributing to decreased intake, treat as appropriate  - Assist with meals as needed  - Monitor I&O, weight, and lab values if indicated  - Obtain nutrition services referral as needed  Outcome: Progressing     Problem: METABOLIC, FLUID AND ELECTROLYTES - ADULT  Goal: Electrolytes maintained within normal limits  Description: INTERVENTIONS:  - Monitor labs and assess patient for signs and symptoms of electrolyte imbalances  - Administer electrolyte replacement as ordered  - Monitor response to electrolyte replacements, including repeat lab results as appropriate  - Instruct patient on fluid and nutrition as appropriate  Outcome: Progressing  Goal: Fluid balance maintained  Description: INTERVENTIONS:  - Monitor labs   - Monitor I/O and WT  - Instruct patient on fluid and nutrition as appropriate  - Assess for signs & symptoms of volume excess or deficit  Outcome: Progressing

## 2022-06-15 NOTE — PLAN OF CARE
Problem: PAIN - ADULT  Goal: Verbalizes/displays adequate comfort level or baseline comfort level  Description: Interventions:  - Encourage patient to monitor pain and request assistance  - Assess pain using appropriate pain scale  - Administer analgesics based on type and severity of pain and evaluate response  - Implement non-pharmacological measures as appropriate and evaluate response  - Consider cultural and social influences on pain and pain management  - Notify physician/advanced practitioner if interventions unsuccessful or patient reports new pain  Outcome: Progressing     Problem: INFECTION - ADULT  Goal: Absence or prevention of progression during hospitalization  Description: INTERVENTIONS:  - Assess and monitor for signs and symptoms of infection  - Monitor lab/diagnostic results  - Monitor all insertion sites, i e  indwelling lines, tubes, and drains  - Monitor endotracheal if appropriate and nasal secretions for changes in amount and color  - [place] appropriate cooling/warming therapies per order  - Administer medications as ordered  - Instruct and encourage patient and family to use good hand hygiene technique  - Identify and instruct in appropriate isolation precautions for identified infection/condition  Outcome: Progressing  Goal: Absence of fever/infection during neutropenic period  Description: INTERVENTIONS:  - Monitor WBC    Outcome: Progressing     Problem: SAFETY ADULT  Goal: Patient will remain free of falls  Description: INTERVENTIONS:  - Educate patient/family on patient safety including physical limitations  - Instruct patient to call for assistance with activity   - Consult OT/PT to assist with strengthening/mobility   - Keep Call bell within reach  - Keep bed low and locked with side rails adjusted as appropriate  - Keep care items and personal belongings within reach  - Initiate and maintain comfort rounds  - Make Fall Risk Sign visible to staff  - Apply yellow socks and bracelet for high fall risk patients  - Consider moving patient to room near nurses station  Outcome: Progressing  Goal: Maintain or return to baseline ADL function  Description: INTERVENTIONS:  -  Assess patient's ability to carry out ADLs; assess patient's baseline for ADL function and identify physical deficits which impact ability to perform ADLs (bathing, care of mouth/teeth, toileting, grooming, dressing, etc )  - Assess/evaluate cause of self-care deficits   - Assess range of motion  - Assess patient's mobility; develop plan if impaired  - Assess patient's need for assistive devices and provide as appropriate  - Encourage maximum independence but intervene and supervise when necessary  - Involve family in performance of ADLs  - Assess for home care needs following discharge   - Consider OT consult to assist with ADL evaluation and planning for discharge  - Provide patient education as appropriate  Outcome: Progressing  Goal: Maintains/Returns to pre admission functional level  Description: INTERVENTIONS:  - Perform BMAT or MOVE assessment daily    - Set and communicate daily mobility goal to care team and patient/family/caregiver     - Collaborate with rehabilitation services on mobility goals if consulted  - Out of bed for toileting  - Record patient progress and toleration of activity level   Outcome: Progressing     Problem: DISCHARGE PLANNING  Goal: Discharge to home or other facility with appropriate resources  Description: INTERVENTIONS:  - Identify barriers to discharge w/patient and caregiver  - Arrange for needed discharge resources and transportation as appropriate  - Identify discharge learning needs (meds, wound care, etc )  - Arrange for interpretive services to assist at discharge as needed  - Refer to Case Management Department for coordinating discharge planning if the patient needs post-hospital services based on physician/advanced practitioner order or complex needs related to functional status, cognitive ability, or social support system  Outcome: Progressing     Problem: Knowledge Deficit  Goal: Patient/family/caregiver demonstrates understanding of disease process, treatment plan, medications, and discharge instructions  Description: Complete learning assessment and assess knowledge base    Interventions:  - Provide teaching at level of understanding  - Provide teaching via preferred learning methods  Outcome: Progressing     Problem: GASTROINTESTINAL - ADULT  Goal: Minimal or absence of nausea and/or vomiting  Description: INTERVENTIONS:  - Administer IV fluids if ordered to ensure adequate hydration  - Maintain NPO status until nausea and vomiting are resolved  - Nasogastric tube if ordered  - Administer ordered antiemetic medications as needed  - Provide nonpharmacologic comfort measures as appropriate  - Advance diet as tolerated, if ordered  - Consider nutrition services referral to assist patient with adequate nutrition and appropriate food choices  Outcome: Progressing  Goal: Maintains adequate nutritional intake  Description: INTERVENTIONS:  - Monitor percentage of each meal consumed  - Identify factors contributing to decreased intake, treat as appropriate  - Assist with meals as needed  - Monitor I&O, weight, and lab values if indicated  - Obtain nutrition services referral as needed  Outcome: Progressing     Problem: METABOLIC, FLUID AND ELECTROLYTES - ADULT  Goal: Electrolytes maintained within normal limits  Description: INTERVENTIONS:  - Monitor labs and assess patient for signs and symptoms of electrolyte imbalances  - Administer electrolyte replacement as ordered  - Monitor response to electrolyte replacements, including repeat lab results as appropriate  - Instruct patient on fluid and nutrition as appropriate  Outcome: Progressing  Goal: Fluid balance maintained  Description: INTERVENTIONS:  - Monitor labs   - Monitor I/O and WT  - Instruct patient on fluid and nutrition as appropriate  - Assess for signs & symptoms of volume excess or deficit  Outcome: Progressing

## 2022-06-15 NOTE — DISCHARGE INSTRUCTIONS
Bariatric/Weight Loss Surgery  Hospital Discharge Instructions  ACTIVITY:  Progress as feels comfortable - a good rule is:  if you are doing something and it begins to hurt, stop doing the activity  Walk every hour while at home  You may walk stairs if you do so slowly  You may shower 48 hours after surgery  Do not scrub incision sites  Blot gently with clean towel to dry incisions  (see #4 below)   Use your incentive spirometer 10 times per hour while awake for 1 week after surgery  Do NOT drive for 48 hours after surgery  No driving 24 hours after taking certain prescription pain medications  Examples of such medication are Percocet, Darvocet, Oxycodone, Tylenol #3, and Tylenol with Codeine  DIET  Stay on a liquid diet for 7 days after your surgery date, sipping slowly  Refer to your manual for examples of choices  Remember to keep your liquids sugar free or low calorie  You may have protein drinks  Make sure to drink 48 to 64 ounces per day of fluids  You may advance to a pureed diet one week after surgery as instructed by your diet progression pamphlet  Once you get approval from your surgeon at your first post operative visit, you may advance to the soft diet and remain on soft diet for 8 weeks unless otherwise instructed  MEDICATIONS:  The abdominal nerve block will wear off during the first 1-2 days that you are home, and you may become sore (especially over incision site/sites where abdominal wall is sutured)  This may create a pulling sensation, especially while moving around, and will fade over time  Continue to take your Tylenol and your pain medication as instructed  Start vitamins and minerals one week after surgery or when you start stage 3/puree diet  Anti-acid Medication as per prescription  Other medications as indicated on the Physician Patient Discharge Instructions form given to you at the time of discharge    Make sure that you are splitting your pill or tablet medications in halves or fourths or even crushing them before you take them  Capsules should be opened and mixed with water or jello  You need to do this for at least 4 weeks after surgery  Eventually you will be able to take your medications the regular way as they were prescribed  You will need to consult with your Family Doctor in regards to all your prescribed medication, particularly those for blood pressure and diabetes  As you lose weight, medical conditions may change, requiring an alteration or elimination of the drug dose  Monitor blood pressure closely and call PCP with any concerns  Sleeve Gastrectomy patients ONLY:  Complete full course of lovenox injections! DO NOT TAKE BIRTH CONTROL(BC) MEDICATIONS, INSERT BC VAGINAL RINGS, OR PLACE IUD OR ANY OTHER BC METHODS UNTIL 31 DAYS FROM DAY OF DISCHARGE FROM HOSPITAL  THIS PLACES YOU AT HIGH RISK FOR A POTENTIALLY LIFE THREATENING BLOOD CLOT  Remember to always use barrier methods for birth control and speak to your GYN about using two forms of birth control to start 31 days after surgery  It is very important to avoid pregnancy until at least 18-24 months after surgery  INCISION CARE  You may shower and get incisions wet 2 days after surgery  No soaking tub baths or swimming for 30 days after surgery  Keep abdominal area and incisions clean  Use soap and water to create a good lather and rinse off  Do not scrub incisions  If you have a drain, empty the drain as the nurses instructed  FOLLOW-UP APPOINTMENT should be made for one week after discharge  Call surgeons office at [phone] to schedule an appointment      CALL YOUR DOCTOR FOR:  pain not controlled by pain medications, a temperature greater than 101 5° F, any increase or change in drainage or redness from any incision, any vomiting or inability to keep liquids down, shortness of breath, shoulder pain, or bleeding

## 2022-06-15 NOTE — PROGRESS NOTES
Progress Note - Bariatric Surgery   Dacia Crew 33 y o  female MRN: [id number]  Unit/Bed#: E5 -01 Encounter: [id number]      Subjective/Objective     Subjective:  Patient with morbid obesity s/p Robot Assisted Sleeve Gastrectomy, POD1    Patient has been tolerating a liquid diet without nausea or vomiting today  Ambulating without assistance, voiding well, and using incentive spirometer  Pain is adequately controlled on oral pain medication  Patient denies fevers, chills, sweats, SOB, CP, calf pain  Objective:    /62   Pulse (!) 46   Temp 98 °F (36 7 °C)   Resp 18   Ht 5' 3" (1 6 m)   Wt 106 kg (234 lb 5 6 oz) Comment: clothing on  LMP 05/29/2022   SpO2 93%   BMI 41 51 kg/m²       Intake/Output Summary (Last 24 hours) at 6/15/2022 0820  Last data filed at 6/15/2022 0747  Gross per 24 hour   Intake 1500 ml   Output 950 ml   Net 550 ml       Invasive Devices  Report    Peripheral Intravenous Line  Duration           Peripheral IV 06/14/22 Left;Ventral (anterior) Forearm 1 day                ROS: 10-point system completed  All negative except see HPI  Physical Exam    General Appearance:    Alert, cooperative, no distress, appears stated age   Head:    Normocephalic, without obvious abnormality, atraumatic   Lungs:     Respirations unlabored   Heart:    Regular rate and rhythm   Abdomen:     Soft, appropriate incisional tenderness, no masses, no organomegaly, non-distended   Extremities:   Extremities normal, atraumatic, no cyanosis or edema   Neurologic:  Incision:    Psych:   Normal strength and sensation    Abdominal incisions are clean, dry, and intact, without   redness, bleeding, or drainage    Normal mood and affect       Lab, Imaging and other studies:  I have personally reviewed pertinent lab results    , CBC:   Lab Results   Component Value Date    WBC 11 73 (H) 06/15/2022    HGB 10 9 (L) 06/15/2022    HCT 33 3 (L) 06/15/2022    MCV 88 06/15/2022     06/15/2022    MCH 28 8 06/15/2022    MCHC 32 7 06/15/2022    RDW 13 9 06/15/2022    MPV 10 0 06/15/2022   , CMP:   Lab Results   Component Value Date    SODIUM 138 06/15/2022    K 3 9 06/15/2022     06/15/2022    CO2 28 06/15/2022    BUN 12 06/15/2022    CREATININE 1 01 06/15/2022    CALCIUM 9 0 06/15/2022    EGFR 73 06/15/2022        VTE Mechanical Prophylaxis: sequential compression device  VTE Mechanical Prophylaxis: PreOp Lovenox given    Assessment/Plan  1)  Patient with morbid obesity s/p Robot Assisted Sleeve Gastrectomy with stable post op course  Patient afebrile and hemodynamically stable  - Encourage PO fluids  - Recommend ambulation, use of SCDs when not ambulating,   - Incentive spirometry encouraged  - Okay to give Lovenox   - Patient to remain on PPI upon discharge  - Plan to D/C patient home today pending anticipated progression    Patient seen and examined on rounds  Plan of care was discussed with patient

## 2022-06-15 NOTE — DISCHARGE INSTR - AVS FIRST PAGE
your medications from [address] in [facility] [address] or cut your pills and open capsules, mix with liquid to drink  Take Tylenol every 8 hours around the clock, unless instructed otherwise  Take your omeprazole daily  It is important to stay hydrated and follow your discharge diet progression   Mild nausea is ok as long as you can drink fluids, sip very slowly and get up and walk during any periods of nausea  You may shower normally after 48 hours, but do not scrub incision sites, blot gently with clean towel to dry incisions  Take home medications as usual unless instructed otherwise while in hospital  Follow up with Dr Tila Chin and your PCP within the next week  Sleeve gastrectomy patients ONLY: Complete full course of lovenox injections!

## 2022-06-16 ENCOUNTER — TELEPHONE (OUTPATIENT)
Dept: BARIATRICS | Facility: CLINIC | Age: 32
End: 2022-06-16

## 2022-06-17 ENCOUNTER — TELEPHONE (OUTPATIENT)
Dept: BARIATRICS | Facility: CLINIC | Age: 32
End: 2022-06-17

## 2022-06-17 NOTE — TELEPHONE ENCOUNTER
Post op follow up phone call completed  Pt is sipping liquids, using IS as instructed, reinforced importance of using IS to help prevent pneumonia  Ambulating about home without difficulty  Pain controlled with analgesia  Reaffirmed examples of liquid diet over the next week  Pt stated understanding about discharge instructions and medication adjustments  Was not taking omeprazole but will begin today  Tolerating self administration of Lovenox injections without difficulty  Follow up appt with surgeon scheduled for next week     Instructed to call with any additional questions or concerns

## 2022-06-23 ENCOUNTER — OFFICE VISIT (OUTPATIENT)
Dept: BARIATRICS | Facility: CLINIC | Age: 32
End: 2022-06-23

## 2022-06-23 VITALS
DIASTOLIC BLOOD PRESSURE: 80 MMHG | BODY MASS INDEX: 38.71 KG/M2 | WEIGHT: 218.5 LBS | HEART RATE: 89 BPM | TEMPERATURE: 98.4 F | HEIGHT: 63 IN | OXYGEN SATURATION: 99 % | SYSTOLIC BLOOD PRESSURE: 116 MMHG

## 2022-06-23 DIAGNOSIS — Z98.84 BARIATRIC SURGERY STATUS: Primary | ICD-10-CM

## 2022-06-23 DIAGNOSIS — E66.9 OBESITY, CLASS II, BMI 35-39.9: ICD-10-CM

## 2022-06-23 DIAGNOSIS — E66.01 MORBID (SEVERE) OBESITY DUE TO EXCESS CALORIES (HCC): ICD-10-CM

## 2022-06-23 PROCEDURE — 99024 POSTOP FOLLOW-UP VISIT: CPT | Performed by: SURGERY

## 2022-06-23 PROCEDURE — RECHECK: Performed by: DIETITIAN, REGISTERED

## 2022-06-23 RX ORDER — LANOLIN ALCOHOL/MO/W.PET/CERES
CREAM (GRAM) TOPICAL
COMMUNITY

## 2022-06-23 NOTE — PROGRESS NOTES
Weight Management Nutrition Class     Diagnosis: Obesity    Bariatric Surgeon: Dr Jelani Mena    Surgery: Vertical Sleeve Gastrectomy    Class: first post op note    Topics discussed today include:     fluid goals post op, protein goals post op, constipation, chew food well, exercise, avoidance of alcohol, PPI use, diet progression, hypoglycemia, dumping syndrome, protein supplems, vitamin/mineral supplements and calcium supplements    Patient was able to verbalize basic diet (protein, fluid, vitamin and mineral) recommendations and possible nutrition-related complications   Yes

## 2022-06-23 NOTE — PROGRESS NOTES
Patient ID: Darline Maloney is a 33 y o  female  Subjective:      33 y o  female  S/p Robot Assisted Vertical Sleeve Gastrectomy with Dr Deanne Guthrie on 22 presents to the office today for post-op follow up  Patient has been tolerating pureed diet without N/V, dysphagia  Denies reflux symptoms and has been compliant with PPI therapy  Patient is having regular BMs and passing flatus  Patient is also compliant with MVI therapy and DVT ppx  Historical Information   Past Medical History:   Diagnosis Date    Anxiety     Depression     Disease of thyroid gland     Fibromyalgia, primary     Hashimoto's    Obese     gastric sleeve today 2022    PONV (postoperative nausea and vomiting)      Past Surgical History:   Procedure Laterality Date     SECTION      DILATION AND CURETTAGE OF UTERUS      EGD      KS LAP, EWA RESTRICT PROC, LONGITUDINAL GASTRECTOMY N/A 2022    Procedure: LAP SLEEVE GASTRECTOMY W/ ROBOT;  Surgeon: Cristy Macedo MD;  Location: [facility];  Service: Bariatrics    TONSILLECTOMY      WISDOM TOOTH EXTRACTION       Social History   Social History     Substance and Sexual Activity   Alcohol Use Not Currently    Alcohol/week: 2 0 standard drinks    Types: 1 Glasses of wine, 1 Shots of liquor per week    Comment: rare    4 x  yearly     Social History     Substance and Sexual Activity   Drug Use Never     Social History     Tobacco Use   Smoking Status Never Smoker   Smokeless Tobacco Never Used       Meds/Allergies   all medications and allergies reviewed  Allergies   Allergen Reactions    Clindamycin Anaphylaxis       Review of Systems   Constitutional: Negative  Negative for chills and fever  HENT: Negative  Negative for ear pain and sore throat  Eyes: Negative  Negative for pain and visual disturbance  Respiratory: Negative  Negative for cough and shortness of breath  Cardiovascular: Negative  Negative for chest pain and palpitations  Gastrointestinal: Negative  Negative for abdominal pain and vomiting  Endocrine: Negative  Genitourinary: Negative  Negative for dysuria and hematuria  Musculoskeletal: Negative  Negative for arthralgias and back pain  Skin: Negative  Negative for color change and rash  Allergic/Immunologic: Negative  Neurological: Negative  Negative for seizures and syncope  Hematological: Negative  Psychiatric/Behavioral: Negative  All other systems reviewed and are negative  Objective:    /80 (BP Location: Left arm, Patient Position: Sitting, Cuff Size: Standard)   Pulse 89   Temp 98 4 °F (36 9 °C) (Tympanic)   Ht 5' 3" (1 6 m)   Wt 99 1 kg (218 lb 8 oz)   LMP 05/29/2022   SpO2 99%   BMI 38 71 kg/m²       Physical Exam  Vitals and nursing note reviewed  Constitutional:       Appearance: Normal appearance  She is obese  HENT:      Head: Normocephalic and atraumatic  Nose: Nose normal       Mouth/Throat:      Mouth: Mucous membranes are moist       Pharynx: Oropharynx is clear  Eyes:      Extraocular Movements: Extraocular movements intact  Pupils: Pupils are equal, round, and reactive to light  Cardiovascular:      Rate and Rhythm: Normal rate and regular rhythm  Pulses: Normal pulses  Heart sounds: Normal heart sounds  Pulmonary:      Effort: Pulmonary effort is normal       Breath sounds: Normal breath sounds  Abdominal:      General: Abdomen is flat  Bowel sounds are normal       Palpations: Abdomen is soft  Comments: Incisions are C/D/I  Healing well, without erythema or drainage  No bulges noted, mild bruising from lovenox   Musculoskeletal:         General: Normal range of motion  Cervical back: Normal range of motion and neck supple  Skin:     General: Skin is warm and dry  Neurological:      General: No focal deficit present  Mental Status: She is alert and oriented to person, place, and time  Mental status is at baseline  Psychiatric:         Mood and Affect: Mood normal          Behavior: Behavior normal          Thought Content: Thought content normal          Judgment: Judgment normal            Intraoperative Pathology reviewed with patient  Final Diagnosis   A  Stomach, sleeve gastrectomy:  - Segment of full-thickness stomach with no pathologic abnormality   - Negative for intestinal metaplasia, dysplasia or carcinoma  - No Helicobacter pylori is identified on H&E stained slide  Assessment/Plan:     Diagnoses and all orders for this visit:    Bariatric surgery status    Obesity, Class II, BMI 35-39 9    Other orders  -     Calcium 500-100 MG-UNIT CHEW; Chew         33 y o  female s/p Robot Assisted Vertical Sleeve Gastrectomy with Dr Nichole Lanes on 6/14/22, overall doing Well  · Continue PPI therapy  · Continue vitamins as directed, with routine blood work follow up  · Patient to follow up with dietician per Bariatric protocols  · Continued/Maintain healthy weight loss with good nutrition intakes  · Adequate hydration with at least 64oz  fluid intake  · Follow diet as discussed  · Follow vitamin and mineral recommendations as reviewed with you  · Exercise as tolerated        Brian Swift PA-C  Bariatric Surgery

## 2022-06-23 NOTE — UTILIZATION REVIEW
Notification of Discharge   This is a Notification of Discharge from our facility [address]  Please be advised that this patient has been discharge from our facility  Below you will find the admission and discharge date and time including the patients disposition  UTILIZATION REVIEW CONTACT:  Malika Denney MA  Utilization   Network Utilization Review Department  Phone: [phone] x carefully listen to the prompts  All voicemails are confidential   Email: [email or web address]     PHYSICIAN ADVISORY SERVICES:  FOR [id number] REVIEW - MEDICAL NECESSITY DENIAL  Phone: [phone]  Fax: [phone]  Email: [email or web address]     PRESENTATION DATE: 6/14/2022  5:01 AM  OBERVATION ADMISSION DATE:   INPATIENT ADMISSION DATE: 6/14/22 10:15 AM   DISCHARGE DATE: 6/15/2022 12:21 PM  DISPOSITION: Home/Self Care Home/Self Care      IMPORTANT INFORMATION:  Send all requests for admission clinical reviews, approved or denied determinations and any other requests to dedicated fax number below belonging to the campus where the patient is receiving treatment   List of dedicated fax numbers:  [address] DENIALS (Administrative/Medical Necessity) [phone]   [address] (Maternity/NICU/Pediatrics) [phone]   [facility] [phone]   130 [facility] [phone]   [address] [place] [address] [phone]   2000 [facility] [address] [place] [address],[address] [phone]   [facility]  [phone]   [address] [facility] [address]  [address] [facility] 1000 [facility] [phone]

## 2022-07-26 NOTE — PROGRESS NOTES
Weight Management Nutrition Class     Diagnosis: Morbid Obesity    Bariatric Surgeon: Dr Birdgett Bee    Surgery: Vertical Sleeve Gastrectomy    Class: 5 week post op     Topics discussed today include:     fluid goals post op, protein goals post op, constipation, chew food well, exercise, avoidance of alcohol, PPI use, diet progression, hypoglycemia, dumping syndrome, protein supplems, vitamin/mineral supplements and calcium supplements    Patient was able to verbalize basic diet (protein, fluid, vitamin and mineral) recommendations and possible nutrition-related complications   Yes

## 2022-07-27 ENCOUNTER — CLINICAL SUPPORT (OUTPATIENT)
Dept: BARIATRICS | Facility: CLINIC | Age: 32
End: 2022-07-27

## 2022-07-27 DIAGNOSIS — Z98.84 BARIATRIC SURGERY STATUS: Primary | ICD-10-CM

## 2022-07-27 DIAGNOSIS — E66.01 MORBID (SEVERE) OBESITY DUE TO EXCESS CALORIES (HCC): ICD-10-CM

## 2022-07-27 PROCEDURE — RECHECK: Performed by: DIETITIAN, REGISTERED

## 2022-09-23 ENCOUNTER — OFFICE VISIT (OUTPATIENT)
Dept: BARIATRICS | Facility: CLINIC | Age: 32
End: 2022-09-23
Payer: COMMERCIAL

## 2022-09-23 VITALS
OXYGEN SATURATION: 99 % | SYSTOLIC BLOOD PRESSURE: 106 MMHG | WEIGHT: 187.5 LBS | HEIGHT: 63 IN | TEMPERATURE: 99.3 F | DIASTOLIC BLOOD PRESSURE: 70 MMHG | HEART RATE: 66 BPM | BODY MASS INDEX: 33.22 KG/M2

## 2022-09-23 DIAGNOSIS — Z48.815 ENCOUNTER FOR SURGICAL AFTERCARE FOLLOWING SURGERY OF DIGESTIVE SYSTEM: Primary | ICD-10-CM

## 2022-09-23 DIAGNOSIS — Z98.84 BARIATRIC SURGERY STATUS: ICD-10-CM

## 2022-09-23 DIAGNOSIS — E66.9 OBESITY, CLASS I, BMI 30-34.9: ICD-10-CM

## 2022-09-23 DIAGNOSIS — K91.2 POSTSURGICAL MALABSORPTION: ICD-10-CM

## 2022-09-23 PROCEDURE — 99213 OFFICE O/P EST LOW 20 MIN: CPT | Performed by: NURSE PRACTITIONER

## 2022-09-23 NOTE — PATIENT INSTRUCTIONS
You can take a regular muiltivitamin with iron   Add B-complex, vitamin D3 - 2,000 IU and calcium 500 mg three times a day   (Goal for calcium is 1200 mg-1500 mg per day)

## 2022-09-23 NOTE — PROGRESS NOTES
Assessment/Plan:     Patient ID: Victorina Hutchins is a 33 y o  female  Bariatric Surgery Status    -s/p Vertical Sleeve Gastrectomy with Dr Sherren Kinds on 06/14/2022  Presents to the office today for 2nd post op visit  Overall doing well, tolerating a regular diet  Denies having any abdominal pain, N/V/D/C, regurgitation, reflux or dysphagia  Taking her MVI but causing vomiting  PLAN:     - Routine follow up in 3 months for 3rd post op visit  - Continue with healthy lifestyle, adequate protein intake of 60 gm, fluid intake of at least 64 oz  - Continue with MVI daily  - Activity as tolerated  - Labs ordered and will adjust accordingly if any deficiency  - Follow up with RD and SW as needed  · Continued/Maintain healthy weight loss with good nutrition intakes  · Adequate hydration with at least 64oz  fluid intake  · Follow diet as discussed  · Follow vitamin and mineral recommendations as reviewed with you  · Exercise as tolerated  · Colonoscopy referral made: not of age range  · Mammogram - not of age range    · Follow-up in 3 months for 3rd post op visit  We kindly ask that your arrive 15 minutes before your scheduled appointment time with your provider to allow our staff to room you, get your vital signs and update your chart  · Get lab work done prior to annual visit  Please call the office if you need a script  It is recommended to check with your insurance BEFORE getting labs done to make sure they are covered by your policy  · Call our office if you have any problems with abdominal pain especially associated with fever, chills, nausea, vomiting or any other concerns  · All  Post-bariatric surgery patients should be aware that very small quantities of any alcohol can cause impairment and it is very possible not to feel the effect  The effect can be in the system for several hours  It is also a stomach irritant       · It is advised to AVOID alcohol, Nonsteroidal antiinflammatory drugs (NSAIDS) and nicotine of all forms   Any of these can cause stomach irritation/pain  · Discussed the effects of alcohol on a bariatric patient and the increased impairment risk  · Keep up the good work! Postsurgical Malabsorption   -At risk for malabsorption of vitamins/minerals secondary to malabsorption and restriction of intake from bariatric surgery  -Currently taking adequate postop bariatric surgery vitamin supplementation  -Next set of bariatric labs ordered for approximately 2 weeks  -Patient received education about the importance of adhering to a lifelong supplementation regimen to avoid vitamin/mineral deficiencies      Diagnoses and all orders for this visit:    Encounter for surgical aftercare following surgery of digestive system  -     Zinc; Future  -     Vitamin D 25 hydroxy; Future  -     Vitamin B12; Future  -     Vitamin B1, whole blood; Future  -     Vitamin A; Future  -     PTH, intact; Future  -     CBC and Platelet; Future  -     Comprehensive metabolic panel; Future  -     Ferritin; Future  -     Fe+TIBC+Torey; Future  -     TSH, 3rd generation with Free T4 reflex; Future  -     Zinc  -     Vitamin D 25 hydroxy  -     Vitamin B12  -     Vitamin B1, whole blood  -     Vitamin A  -     PTH, intact  -     CBC and Platelet  -     Comprehensive metabolic panel  -     Ferritin  -     Fe+TIBC+Torey  -     TSH, 3rd generation with Free T4 reflex    Bariatric surgery status  -     Zinc; Future  -     Vitamin D 25 hydroxy; Future  -     Vitamin B12; Future  -     Vitamin B1, whole blood; Future  -     Vitamin A; Future  -     PTH, intact; Future  -     CBC and Platelet; Future  -     Comprehensive metabolic panel; Future  -     Ferritin; Future  -     Fe+TIBC+Torey; Future  -     TSH, 3rd generation with Free T4 reflex;  Future  -     Zinc  -     Vitamin D 25 hydroxy  -     Vitamin B12  -     Vitamin B1, whole blood  -     Vitamin A  -     PTH, intact  -     CBC and Platelet  -     Comprehensive metabolic panel  -     Ferritin  -     Fe+TIBC+Torey  -     TSH, 3rd generation with Free T4 reflex    Postsurgical malabsorption  -     Zinc; Future  -     Vitamin D 25 hydroxy; Future  -     Vitamin B12; Future  -     Vitamin B1, whole blood; Future  -     Vitamin A; Future  -     PTH, intact; Future  -     CBC and Platelet; Future  -     Comprehensive metabolic panel; Future  -     Ferritin; Future  -     Fe+TIBC+Torey; Future  -     TSH, 3rd generation with Free T4 reflex; Future  -     Zinc  -     Vitamin D 25 hydroxy  -     Vitamin B12  -     Vitamin B1, whole blood  -     Vitamin A  -     PTH, intact  -     CBC and Platelet  -     Comprehensive metabolic panel  -     Ferritin  -     Fe+TIBC+Torey  -     TSH, 3rd generation with Free T4 reflex    Obesity, Class I, BMI 30-34 9  -     Zinc; Future  -     Vitamin D 25 hydroxy; Future  -     Vitamin B12; Future  -     Vitamin B1, whole blood; Future  -     Vitamin A; Future  -     PTH, intact; Future  -     CBC and Platelet; Future  -     Comprehensive metabolic panel; Future  -     Ferritin; Future  -     Fe+TIBC+Torey; Future  -     TSH, 3rd generation with Free T4 reflex; Future  -     Zinc  -     Vitamin D 25 hydroxy  -     Vitamin B12  -     Vitamin B1, whole blood  -     Vitamin A  -     PTH, intact  -     CBC and Platelet  -     Comprehensive metabolic panel  -     Ferritin  -     Fe+TIBC+Torey  -     TSH, 3rd generation with Free T4 reflex         Subjective:      Patient ID: Sarah Naranjo is a 33 y o  female  -s/p Vertical Sleeve Gastrectomy with Dr Salvador James on 06/14/2022  Presents to the office today for 2nd post op visit  Overall doing well, tolerating a regular diet  Denies having any abdominal pain, N/V/D/C, regurgitation, reflux or dysphagia  Taking her MVI but causing vomiting      Initial: 243 lbs  Current: 187 5 lbs  EWL: (Weight loss is ahead of schedule at this post surgical period )  Charles: current   Current BMI is Body mass index is 33 21 kg/m²  · Tolerating a regular diet-yes  · Eating at least 60 grams of protein per day-yes  · Following 30/60 minute rule with liquids-yes  · Drinking at least 64 ounces of fluid per day-yes  · Drinking carbonated beverages-no  · Sufficient exercise-yes - walking   · Using NSAIDs regularly-no  · Using nicotine-no  · Using alcohol-no  · Supplements: Multivitamins, Calcium  and vitamin D3    · EWL is 54%, which places the patient ahead of schedule for expected post surgical weight loss at this time  The following portions of the patient's history were reviewed and updated as appropriate: allergies, current medications, past family history, past medical history, past social history, past surgical history and problem list     Review of Systems   Constitutional: Negative  Respiratory: Negative  Cardiovascular: Negative  Gastrointestinal: Negative  Musculoskeletal: Negative  Neurological: Negative  Psychiatric/Behavioral: Negative  Objective:    /70 (BP Location: Left arm, Patient Position: Sitting, Cuff Size: Large)   Pulse 66   Temp 99 3 °F (37 4 °C) (Tympanic)   Ht 5' 3" (1 6 m)   Wt 85 kg (187 lb 8 oz)   SpO2 99%   BMI 33 21 kg/m²      Physical Exam  Vitals and nursing note reviewed  Constitutional:       Appearance: Normal appearance  She is obese  Cardiovascular:      Rate and Rhythm: Normal rate and regular rhythm  Pulses: Normal pulses  Pulmonary:      Effort: Pulmonary effort is normal       Breath sounds: Normal breath sounds  Abdominal:      General: Bowel sounds are normal       Palpations: Abdomen is soft  Musculoskeletal:         General: Normal range of motion  Skin:     General: Skin is warm and dry  Neurological:      General: No focal deficit present  Mental Status: She is alert and oriented to person, place, and time     Psychiatric:         Mood and Affect: Mood normal          Behavior: Behavior normal  Thought Content:  Thought content normal          Judgment: Judgment normal

## 2022-11-10 ENCOUNTER — APPOINTMENT (EMERGENCY)
Dept: ULTRASOUND IMAGING | Facility: HOSPITAL | Age: 32
End: 2022-11-10

## 2022-11-10 ENCOUNTER — APPOINTMENT (INPATIENT)
Dept: RADIOLOGY | Facility: HOSPITAL | Age: 32
End: 2022-11-10

## 2022-11-10 ENCOUNTER — ANESTHESIA (INPATIENT)
Dept: PERIOP | Facility: HOSPITAL | Age: 32
End: 2022-11-10

## 2022-11-10 ENCOUNTER — ANESTHESIA EVENT (INPATIENT)
Dept: PERIOP | Facility: HOSPITAL | Age: 32
End: 2022-11-10

## 2022-11-10 ENCOUNTER — APPOINTMENT (EMERGENCY)
Dept: CT IMAGING | Facility: HOSPITAL | Age: 32
End: 2022-11-10

## 2022-11-10 ENCOUNTER — HOSPITAL ENCOUNTER (INPATIENT)
Facility: HOSPITAL | Age: 32
LOS: 1 days | Discharge: HOME | End: 2022-11-10
Attending: EMERGENCY MEDICINE | Admitting: SURGERY

## 2022-11-10 VITALS
DIASTOLIC BLOOD PRESSURE: 83 MMHG | HEIGHT: 63 IN | TEMPERATURE: 97.9 F | OXYGEN SATURATION: 95 % | HEART RATE: 56 BPM | SYSTOLIC BLOOD PRESSURE: 127 MMHG | BODY MASS INDEX: 31.48 KG/M2 | RESPIRATION RATE: 15 BRPM | WEIGHT: 177.69 LBS

## 2022-11-10 DIAGNOSIS — R10.9 ABDOMINAL PAIN: ICD-10-CM

## 2022-11-10 DIAGNOSIS — K81.0 ACUTE CHOLECYSTITIS: ICD-10-CM

## 2022-11-10 DIAGNOSIS — K80.00 ACUTE CHOLECYSTITIS DUE TO BILIARY CALCULUS: Primary | ICD-10-CM

## 2022-11-10 PROBLEM — K81.9 CHOLECYSTITIS: Status: ACTIVE | Noted: 2022-11-10

## 2022-11-10 LAB
ABO GROUP BLD: NORMAL
ABO GROUP BLD: NORMAL
ALBUMIN SERPL BCP-MCNC: 3.9 G/DL (ref 3.5–5)
ALP SERPL-CCNC: 70 U/L (ref 46–116)
ALT SERPL W P-5'-P-CCNC: 29 U/L (ref 12–78)
ANION GAP SERPL CALCULATED.3IONS-SCNC: 7 MMOL/L (ref 4–13)
APTT PPP: 28 SECONDS (ref 23–37)
BASOPHILS # BLD AUTO: 0.05 THOUSANDS/ÂΜL (ref 0–0.1)
BASOPHILS NFR BLD AUTO: 0 % (ref 0–1)
BILIRUB SERPL-MCNC: 0.62 MG/DL (ref 0.2–1)
BILIRUB UR QL STRIP: NEGATIVE
BLD GP AB SCN SERPL QL: NEGATIVE
BUN SERPL-MCNC: 23 MG/DL (ref 5–25)
CALCIUM SERPL-MCNC: 9 MG/DL (ref 8.3–10.1)
CHLORIDE SERPL-SCNC: 103 MMOL/L (ref 96–108)
CLARITY UR: CLEAR
CO2 SERPL-SCNC: 30 MMOL/L (ref 21–32)
COLOR UR: YELLOW
CREAT SERPL-MCNC: 0.92 MG/DL (ref 0.6–1.3)
EOSINOPHIL # BLD AUTO: 0.04 THOUSAND/ÂΜL (ref 0–0.61)
EOSINOPHIL NFR BLD AUTO: 0 % (ref 0–6)
ERYTHROCYTE [DISTWIDTH] IN BLOOD BY AUTOMATED COUNT: 13.1 % (ref 11.6–15.1)
EXT PREG TEST URINE: NEGATIVE
EXT. CONTROL ED NAV: NORMAL
GFR SERPL CREATININE-BSD FRML MDRD: 82 ML/MIN/1.73SQ M
GLUCOSE SERPL-MCNC: 129 MG/DL (ref 65–140)
GLUCOSE UR STRIP-MCNC: NEGATIVE MG/DL
HCT VFR BLD AUTO: 36.1 % (ref 34.8–46.1)
HGB BLD-MCNC: 12.1 G/DL (ref 11.5–15.4)
HGB UR QL STRIP.AUTO: NEGATIVE
IMM GRANULOCYTES # BLD AUTO: 0.08 THOUSAND/UL (ref 0–0.2)
IMM GRANULOCYTES NFR BLD AUTO: 1 % (ref 0–2)
INR PPP: 1.02 (ref 0.84–1.19)
KETONES UR STRIP-MCNC: ABNORMAL MG/DL
LEUKOCYTE ESTERASE UR QL STRIP: NEGATIVE
LIPASE SERPL-CCNC: 115 U/L (ref 73–393)
LYMPHOCYTES # BLD AUTO: 1.28 THOUSANDS/ÂΜL (ref 0.6–4.47)
LYMPHOCYTES NFR BLD AUTO: 9 % (ref 14–44)
MCH RBC QN AUTO: 30.5 PG (ref 26.8–34.3)
MCHC RBC AUTO-ENTMCNC: 33.5 G/DL (ref 31.4–37.4)
MCV RBC AUTO: 91 FL (ref 82–98)
MONOCYTES # BLD AUTO: 0.59 THOUSAND/ÂΜL (ref 0.17–1.22)
MONOCYTES NFR BLD AUTO: 4 % (ref 4–12)
NEUTROPHILS # BLD AUTO: 12.59 THOUSANDS/ÂΜL (ref 1.85–7.62)
NEUTS SEG NFR BLD AUTO: 86 % (ref 43–75)
NITRITE UR QL STRIP: NEGATIVE
NRBC BLD AUTO-RTO: 0 /100 WBCS
PH UR STRIP.AUTO: 6.5 [PH] (ref 4.5–8)
PLATELET # BLD AUTO: 243 THOUSANDS/UL (ref 149–390)
PMV BLD AUTO: 10 FL (ref 8.9–12.7)
POTASSIUM SERPL-SCNC: 3.7 MMOL/L (ref 3.5–5.3)
PROT SERPL-MCNC: 7.6 G/DL (ref 6.4–8.4)
PROT UR STRIP-MCNC: NEGATIVE MG/DL
PROTHROMBIN TIME: 13.4 SECONDS (ref 11.6–14.5)
RBC # BLD AUTO: 3.97 MILLION/UL (ref 3.81–5.12)
RH BLD: NEGATIVE
RH BLD: NEGATIVE
SODIUM SERPL-SCNC: 140 MMOL/L (ref 135–147)
SP GR UR STRIP.AUTO: >=1.03 (ref 1–1.03)
SPECIMEN EXPIRATION DATE: NORMAL
UROBILINOGEN UR QL STRIP.AUTO: 1 E.U./DL
WBC # BLD AUTO: 14.63 THOUSAND/UL (ref 4.31–10.16)

## 2022-11-10 PROCEDURE — 0FT44ZZ RESECTION OF GALLBLADDER, PERCUTANEOUS ENDOSCOPIC APPROACH: ICD-10-PCS | Performed by: SURGERY

## 2022-11-10 RX ORDER — CEFAZOLIN SODIUM 2 G/50ML
2000 SOLUTION INTRAVENOUS EVERY 8 HOURS
Status: DISCONTINUED | OUTPATIENT
Start: 2022-11-10 | End: 2022-11-10

## 2022-11-10 RX ORDER — BUPIVACAINE HYDROCHLORIDE 5 MG/ML
INJECTION, SOLUTION PERINEURAL AS NEEDED
Status: DISCONTINUED | OUTPATIENT
Start: 2022-11-10 | End: 2022-11-10 | Stop reason: HOSPADM

## 2022-11-10 RX ORDER — METRONIDAZOLE 500 MG/100ML
500 INJECTION, SOLUTION INTRAVENOUS EVERY 8 HOURS
Status: DISCONTINUED | OUTPATIENT
Start: 2022-11-10 | End: 2022-11-10

## 2022-11-10 RX ORDER — PANTOPRAZOLE SODIUM 20 MG/1
20 TABLET, DELAYED RELEASE ORAL
Refills: 2 | Status: DISCONTINUED | OUTPATIENT
Start: 2022-11-10 | End: 2022-11-10 | Stop reason: HOSPADM

## 2022-11-10 RX ORDER — ROCURONIUM BROMIDE 10 MG/ML
INJECTION, SOLUTION INTRAVENOUS AS NEEDED
Status: DISCONTINUED | OUTPATIENT
Start: 2022-11-10 | End: 2022-11-10

## 2022-11-10 RX ORDER — GABAPENTIN 300 MG/1
300 CAPSULE ORAL DAILY
Status: DISCONTINUED | OUTPATIENT
Start: 2022-11-10 | End: 2022-11-10 | Stop reason: HOSPADM

## 2022-11-10 RX ORDER — ONDANSETRON 2 MG/ML
4 INJECTION INTRAMUSCULAR; INTRAVENOUS EVERY 4 HOURS PRN
Status: DISCONTINUED | OUTPATIENT
Start: 2022-11-10 | End: 2022-11-10 | Stop reason: HOSPADM

## 2022-11-10 RX ORDER — ONDANSETRON 2 MG/ML
4 INJECTION INTRAMUSCULAR; INTRAVENOUS ONCE
Status: COMPLETED | OUTPATIENT
Start: 2022-11-10 | End: 2022-11-10

## 2022-11-10 RX ORDER — ACETAMINOPHEN 325 MG/1
975 TABLET ORAL EVERY 6 HOURS PRN
Status: DISCONTINUED | OUTPATIENT
Start: 2022-11-10 | End: 2022-11-10 | Stop reason: HOSPADM

## 2022-11-10 RX ORDER — OXYCODONE HYDROCHLORIDE 10 MG/1
10 TABLET ORAL EVERY 4 HOURS PRN
Status: DISCONTINUED | OUTPATIENT
Start: 2022-11-10 | End: 2022-11-10 | Stop reason: HOSPADM

## 2022-11-10 RX ORDER — METOCLOPRAMIDE HYDROCHLORIDE 5 MG/ML
10 INJECTION INTRAMUSCULAR; INTRAVENOUS EVERY 6 HOURS PRN
Status: DISCONTINUED | OUTPATIENT
Start: 2022-11-10 | End: 2022-11-10 | Stop reason: HOSPADM

## 2022-11-10 RX ORDER — LEVOTHYROXINE SODIUM 0.12 MG/1
125 TABLET ORAL DAILY
Status: DISCONTINUED | OUTPATIENT
Start: 2022-11-10 | End: 2022-11-10 | Stop reason: HOSPADM

## 2022-11-10 RX ORDER — SODIUM CHLORIDE 9 MG/ML
INJECTION, SOLUTION INTRAVENOUS CONTINUOUS PRN
Status: DISCONTINUED | OUTPATIENT
Start: 2022-11-10 | End: 2022-11-10

## 2022-11-10 RX ORDER — MIDAZOLAM HYDROCHLORIDE 2 MG/2ML
INJECTION, SOLUTION INTRAMUSCULAR; INTRAVENOUS AS NEEDED
Status: DISCONTINUED | OUTPATIENT
Start: 2022-11-10 | End: 2022-11-10

## 2022-11-10 RX ORDER — FENTANYL CITRATE/PF 50 MCG/ML
50 SYRINGE (ML) INJECTION
Status: DISCONTINUED | OUTPATIENT
Start: 2022-11-10 | End: 2022-11-10 | Stop reason: HOSPADM

## 2022-11-10 RX ORDER — HEPARIN SODIUM 5000 [USP'U]/ML
5000 INJECTION, SOLUTION INTRAVENOUS; SUBCUTANEOUS EVERY 8 HOURS SCHEDULED
Status: DISCONTINUED | OUTPATIENT
Start: 2022-11-10 | End: 2022-11-10 | Stop reason: HOSPADM

## 2022-11-10 RX ORDER — GABAPENTIN 300 MG/1
300 CAPSULE ORAL
COMMUNITY
Start: 2022-10-19 | End: 2023-01-17

## 2022-11-10 RX ORDER — ONDANSETRON 2 MG/ML
INJECTION INTRAMUSCULAR; INTRAVENOUS AS NEEDED
Status: DISCONTINUED | OUTPATIENT
Start: 2022-11-10 | End: 2022-11-10

## 2022-11-10 RX ORDER — FENTANYL CITRATE 50 UG/ML
INJECTION, SOLUTION INTRAMUSCULAR; INTRAVENOUS AS NEEDED
Status: DISCONTINUED | OUTPATIENT
Start: 2022-11-10 | End: 2022-11-10

## 2022-11-10 RX ORDER — HYDROMORPHONE HCL/PF 1 MG/ML
0.5 SYRINGE (ML) INJECTION
Status: DISCONTINUED | OUTPATIENT
Start: 2022-11-10 | End: 2022-11-10 | Stop reason: HOSPADM

## 2022-11-10 RX ORDER — ONDANSETRON 2 MG/ML
4 INJECTION INTRAMUSCULAR; INTRAVENOUS ONCE AS NEEDED
Status: COMPLETED | OUTPATIENT
Start: 2022-11-10 | End: 2022-11-10

## 2022-11-10 RX ORDER — LIDOCAINE HYDROCHLORIDE 20 MG/ML
INJECTION, SOLUTION EPIDURAL; INFILTRATION; INTRACAUDAL; PERINEURAL AS NEEDED
Status: DISCONTINUED | OUTPATIENT
Start: 2022-11-10 | End: 2022-11-10

## 2022-11-10 RX ORDER — OXYCODONE HYDROCHLORIDE 5 MG/1
5 TABLET ORAL EVERY 4 HOURS PRN
Status: DISCONTINUED | OUTPATIENT
Start: 2022-11-10 | End: 2022-11-10 | Stop reason: HOSPADM

## 2022-11-10 RX ORDER — PROPOFOL 10 MG/ML
INJECTION, EMULSION INTRAVENOUS AS NEEDED
Status: DISCONTINUED | OUTPATIENT
Start: 2022-11-10 | End: 2022-11-10

## 2022-11-10 RX ORDER — ONDANSETRON 2 MG/ML
4 INJECTION INTRAMUSCULAR; INTRAVENOUS EVERY 4 HOURS PRN
Status: DISCONTINUED | OUTPATIENT
Start: 2022-11-10 | End: 2022-11-10

## 2022-11-10 RX ORDER — MORPHINE SULFATE 4 MG/ML
8 INJECTION, SOLUTION INTRAMUSCULAR; INTRAVENOUS ONCE
Status: COMPLETED | OUTPATIENT
Start: 2022-11-10 | End: 2022-11-10

## 2022-11-10 RX ORDER — DEXAMETHASONE SODIUM PHOSPHATE 10 MG/ML
INJECTION, SOLUTION INTRAMUSCULAR; INTRAVENOUS AS NEEDED
Status: DISCONTINUED | OUTPATIENT
Start: 2022-11-10 | End: 2022-11-10

## 2022-11-10 RX ORDER — SODIUM CHLORIDE, SODIUM LACTATE, POTASSIUM CHLORIDE, CALCIUM CHLORIDE 600; 310; 30; 20 MG/100ML; MG/100ML; MG/100ML; MG/100ML
125 INJECTION, SOLUTION INTRAVENOUS CONTINUOUS
Status: DISCONTINUED | OUTPATIENT
Start: 2022-11-10 | End: 2022-11-10 | Stop reason: HOSPADM

## 2022-11-10 RX ORDER — CEFAZOLIN SODIUM 1 G/50ML
SOLUTION INTRAVENOUS AS NEEDED
Status: DISCONTINUED | OUTPATIENT
Start: 2022-11-10 | End: 2022-11-10

## 2022-11-10 RX ORDER — OXYCODONE HYDROCHLORIDE 5 MG/1
5 TABLET ORAL EVERY 4 HOURS PRN
Qty: 16 TABLET | Refills: 0 | Status: SHIPPED | OUTPATIENT
Start: 2022-11-10 | End: 2022-11-20

## 2022-11-10 RX ADMIN — SODIUM CHLORIDE: 0.9 INJECTION, SOLUTION INTRAVENOUS at 14:29

## 2022-11-10 RX ADMIN — DEXAMETHASONE SODIUM PHOSPHATE 10 MG: 10 INJECTION, SOLUTION INTRAMUSCULAR; INTRAVENOUS at 14:48

## 2022-11-10 RX ADMIN — SODIUM CHLORIDE, SODIUM LACTATE, POTASSIUM CHLORIDE, AND CALCIUM CHLORIDE: .6; .31; .03; .02 INJECTION, SOLUTION INTRAVENOUS at 15:35

## 2022-11-10 RX ADMIN — ROCURONIUM BROMIDE 50 MG: 50 INJECTION, SOLUTION INTRAVENOUS at 14:36

## 2022-11-10 RX ADMIN — IOHEXOL 100 ML: 350 INJECTION, SOLUTION INTRAVENOUS at 05:17

## 2022-11-10 RX ADMIN — SODIUM CHLORIDE, SODIUM LACTATE, POTASSIUM CHLORIDE, AND CALCIUM CHLORIDE 125 ML/HR: .6; .31; .03; .02 INJECTION, SOLUTION INTRAVENOUS at 11:49

## 2022-11-10 RX ADMIN — SODIUM CHLORIDE 1000 ML: 0.9 INJECTION, SOLUTION INTRAVENOUS at 03:35

## 2022-11-10 RX ADMIN — PROPOFOL 200 MG: 10 INJECTION, EMULSION INTRAVENOUS at 14:35

## 2022-11-10 RX ADMIN — METOCLOPRAMIDE 10 MG: 5 INJECTION, SOLUTION INTRAMUSCULAR; INTRAVENOUS at 17:54

## 2022-11-10 RX ADMIN — PANTOPRAZOLE SODIUM 20 MG: 20 TABLET, DELAYED RELEASE ORAL at 11:52

## 2022-11-10 RX ADMIN — MORPHINE SULFATE 8 MG: 4 INJECTION INTRAVENOUS at 03:34

## 2022-11-10 RX ADMIN — METRONIDAZOLE 500 MG: 500 INJECTION, SOLUTION INTRAVENOUS at 13:17

## 2022-11-10 RX ADMIN — ONDANSETRON 4 MG: 2 INJECTION INTRAMUSCULAR; INTRAVENOUS at 03:34

## 2022-11-10 RX ADMIN — MIDAZOLAM 2 MG: 1 INJECTION INTRAMUSCULAR; INTRAVENOUS at 14:19

## 2022-11-10 RX ADMIN — FENTANYL CITRATE 100 MCG: 50 INJECTION INTRAMUSCULAR; INTRAVENOUS at 14:59

## 2022-11-10 RX ADMIN — SUGAMMADEX 200 MG: 100 INJECTION, SOLUTION INTRAVENOUS at 15:48

## 2022-11-10 RX ADMIN — LIDOCAINE HYDROCHLORIDE 100 MG: 20 INJECTION, SOLUTION EPIDURAL; INFILTRATION; INTRACAUDAL; PERINEURAL at 14:35

## 2022-11-10 RX ADMIN — CEFAZOLIN SODIUM 2000 MG: 2 SOLUTION INTRAVENOUS at 11:52

## 2022-11-10 RX ADMIN — FENTANYL CITRATE 100 MCG: 50 INJECTION INTRAMUSCULAR; INTRAVENOUS at 14:35

## 2022-11-10 RX ADMIN — FENTANYL CITRATE 50 MCG: 50 INJECTION, SOLUTION INTRAMUSCULAR; INTRAVENOUS at 16:42

## 2022-11-10 RX ADMIN — ONDANSETRON 4 MG: 2 INJECTION INTRAMUSCULAR; INTRAVENOUS at 16:11

## 2022-11-10 RX ADMIN — ONDANSETRON 4 MG: 2 INJECTION INTRAMUSCULAR; INTRAVENOUS at 14:48

## 2022-11-10 RX ADMIN — IOHEXOL 10 ML: 240 INJECTION, SOLUTION INTRATHECAL; INTRAVASCULAR; INTRAVENOUS; ORAL at 05:17

## 2022-11-10 RX ADMIN — CEFAZOLIN SODIUM 1000 MG: 1 SOLUTION INTRAVENOUS at 14:41

## 2022-11-10 NOTE — ED CARE HANDOFF
Emergency Department Sign Out Note        Sign out and transfer of care from Dr Lucho Street  See Separate Emergency Department note  The patient, Katey Jackson, was evaluated by the previous provider for abdominal pain      Workup Completed:  Labs Reviewed   CBC AND DIFFERENTIAL - Abnormal       Result Value Ref Range Status    WBC 14 63 (*) 4 31 - 10 16 Thousand/uL Final    RBC 3 97  3 81 - 5 12 Million/uL Final    Hemoglobin 12 1  11 5 - 15 4 g/dL Final    Hematocrit 36 1  34 8 - 46 1 % Final    MCV 91  82 - 98 fL Final    MCH 30 5  26 8 - 34 3 pg Final    MCHC 33 5  31 4 - 37 4 g/dL Final    RDW 13 1  11 6 - 15 1 % Final    MPV 10 0  8 9 - 12 7 fL Final    Platelets 728  014 - 390 Thousands/uL Final    nRBC 0  /100 WBCs Final    Neutrophils Relative 86 (*) 43 - 75 % Final    Immat GRANS % 1  0 - 2 % Final    Lymphocytes Relative 9 (*) 14 - 44 % Final    Monocytes Relative 4  4 - 12 % Final    Eosinophils Relative 0  0 - 6 % Final    Basophils Relative 0  0 - 1 % Final    Neutrophils Absolute 12 59 (*) 1 85 - 7 62 Thousands/µL Final    Immature Grans Absolute 0 08  0 00 - 0 20 Thousand/uL Final    Lymphocytes Absolute 1 28  0 60 - 4 47 Thousands/µL Final    Monocytes Absolute 0 59  0 17 - 1 22 Thousand/µL Final    Eosinophils Absolute 0 04  0 00 - 0 61 Thousand/µL Final    Basophils Absolute 0 05  0 00 - 0 10 Thousands/µL Final   URINE MACROSCOPIC, POC - Abnormal    Color, UA Yellow   Final    Clarity, UA Clear   Final    pH, UA 6 5  4 5 - 8 0 Final    Leukocytes, UA Negative  Negative Final    Nitrite, UA Negative  Negative Final    Protein, UA Negative  Negative mg/dl Final    Glucose, UA Negative  Negative mg/dl Final    Ketones, UA Trace (*) Negative mg/dl Final    Urobilinogen, UA 1 0  0 2, 1 0 E U /dl E U /dl Final    Bilirubin, UA Negative  Negative Final    Occult Blood, UA Negative  Negative Final    Specific Gravity, UA >=1 030  1 003 - 1 030 Final    Narrative:     CLINITEK RESULT   PROTIME-INR - Normal Protime 13 4  11 6 - 14 5 seconds Final    INR 1 02  0 84 - 1 19 Final   APTT - Normal    PTT 28  23 - 37 seconds Final    Comment: Therapeutic Heparin Range =  60-90 seconds   LIPASE - Normal    Lipase 115  73 - 393 u/L Final   POCT PREGNANCY, URINE - Normal    EXT PREG TEST UR (Ref: Negative) negative   Final    Control valid   Final   COMPREHENSIVE METABOLIC PANEL    Sodium 434  135 - 147 mmol/L Final    Potassium 3 7  3 5 - 5 3 mmol/L Final    Chloride 103  96 - 108 mmol/L Final    CO2 30  21 - 32 mmol/L Final    ANION GAP 7  4 - 13 mmol/L Final    BUN 23  5 - 25 mg/dL Final    Creatinine 0 92  0 60 - 1 30 mg/dL Final    Comment: Standardized to IDMS reference method    Glucose 129  65 - 140 mg/dL Final    Comment: If the patient is fasting, the ADA then defines impaired fasting glucose as > 100 mg/dL and diabetes as > or equal to 123 mg/dL  Specimen collection should occur prior to Sulfasalazine administration due to the potential for falsely depressed results  Specimen collection should occur prior to Sulfapyridine administration due to the potential for falsely elevated results  Calcium 9 0  8 3 - 10 1 mg/dL Final    AST     Final    Comment: Specimen collection should occur prior to Sulfasalazine administration due to the potential for falsely depressed results  ALT 29  12 - 78 U/L Final    Comment: Specimen collection should occur prior to Sulfasalazine administration due to the potential for falsely depressed results  Alkaline Phosphatase 70  46 - 116 U/L Final    Total Protein 7 6  6 4 - 8 4 g/dL Final    Albumin 3 9  3 5 - 5 0 g/dL Final    Total Bilirubin 0 62  0 20 - 1 00 mg/dL Final    Comment: Use of this assay is not recommended for patients undergoing treatment with eltrombopag due to the potential for falsely elevated results      eGFR 82  ml/min/1 73sq m Final    Narrative:     NO AST RESULTS IF NEEDED IT MUST BE ORDERED SEPARATELY  National Kidney Disease Foundation guidelines for Chronic Kidney Disease (CKD):   •  Stage 1 with normal or high GFR (GFR > 90 mL/min/1 73 square meters)  •  Stage 2 Mild CKD (GFR = 60-89 mL/min/1 73 square meters)  •  Stage 3A Moderate CKD (GFR = 45-59 mL/min/1 73 square meters)  •  Stage 3B Moderate CKD (GFR = 30-44 mL/min/1 73 square meters)  •  Stage 4 Severe CKD (GFR = 15-29 mL/min/1 73 square meters)  •  Stage 5 End Stage CKD (GFR <15 mL/min/1 73 square meters)  Note: GFR calculation is accurate only with a steady state creatinine   TYPE AND SCREEN    ABO Grouping O   Final    Rh Factor Negative   Final    Antibody Screen Negative   Final    Specimen Expiration Date [id number]   Final   ABORH RECHECK    ABO Grouping O   Final    Rh Factor Negative   Final     US right upper quadrant   ED Interpretation   See below      Final Result      Mildly dilated gallbladder containing gallstones with top normal caliber gallbladder wall and trace pericholecystic fluid, but no sonographic Humphreys's sign, equivocal for acute cholecystitis  HIDA scan can be obtained for further evaluation  The study was marked in [facility] for immediate notification  Workstation performed: [id number]         CT abdomen pelvis with contrast   Final Result      1  Mildly distended gallbladder with trace pericholecystic fluid, but without calcified cholelithiasis  Mild central intrahepatic and common bile duct dilatation with normal tapering distally  No calcific choledocholithiasis  Correlate with right    upper quadrant pain and ultrasound  2   Mild ascending and proximal transverse colonic wall thickening, which may be due to underdistention versus mild colitis in the appropriate clinical setting  The study was marked in [facility] for immediate notification        Workstation performed: [id number]               ED Course / Workup Pending (followup):                                    ED Course as of 11/10/22 1552   Thu Nov 10, 2022   5560 Assumed care from Dr Chilo Mejía of bariatric surgery - sleeve gastrectomy in June  Elevated WBC, CT w/ findings ? cholecystits, bariatrics requested formal RUQ ultrasound - ordered     0912 Called reading room regarding US results    Tech never verified study, so not in que to be read    They will contact tech to verify and put it right over for reading   0925 TT to Bariatrics regarding patient   [address] responded and indicated the fellow would be in contact with us shortly   0945 Per fellow, Dr Rajiv Barnes, pt will need general surgery consult for acute cholecystitis    TT sent to surg   1000 Updated patient on plan     Procedures  MDM        Disposition  Final diagnoses:   Abdominal pain   Acute cholecystitis     Time reflects when diagnosis was documented in both MDM as applicable and the Disposition within this note     Time User Action Codes Description Comment    11/10/2022 10:33 AM Petrochko, Larkin Goldberg Add [K80 00] Acute cholecystitis due to biliary calculus     11/10/2022 11:57 AM Shannon Guadalupe Add [R10 9] Abdominal pain     11/10/2022 11:57 AM Shannon Guadalupe Add [K81 0] Acute cholecystitis     11/10/2022  3:32 PM Althea Crawley Modify [K80 00] Acute cholecystitis due to biliary calculus       ED Disposition     ED Disposition   Send to OR    Condition   --    Date/Time   Thu Nov 10, 2022 11:57 AM    Comment   Dr Debra Trujillo accepted per surgery resident at [phone]         Follow-up Information     Follow up With Specialties Details Why Contact Info    Karol Rivera MD General Surgery Follow up in 2 week(s)  [address] [facility] [address]          Current Discharge Medication List      CONTINUE these medications which have CHANGED    Details   oxyCODONE (ROXICODONE) 5 immediate release tablet Take 1 tablet (5 mg total) by mouth every 4 (four) hours as needed for moderate pain or severe pain for up to 10 days Max Daily Amount: 30 mg  Qty: 16 tablet, Refills: 0    Associated Diagnoses: Acute cholecystitis due to biliary calculus         CONTINUE these medications which have NOT CHANGED    Details   gabapentin (NEURONTIN) 300 mg capsule 300 mg      levothyroxine 125 mcg tablet Take 125 mcg by mouth daily      Multiple Vitamins-Minerals (VITAMIN D3 COMPLETE PO) Take by mouth        VITAMIN D PO Take 2,000 Units by mouth daily in the early morning      Calcium 500-100 MG-UNIT CHEW Chew      omeprazole (PriLOSEC) 20 mg delayed release capsule TAKE 1 CAPSULE BY MOUTH EVERY DAY  Qty: 90 capsule, Refills: 2    Associated Diagnoses: Obesity, Class III, BMI 40-49 9 (morbid obesity) ([facility])      sertraline (ZOLOFT) 50 mg tablet 50 mg daily         STOP taking these medications       enoxaparin (Lovenox) 40 mg/0 4 mL Comments:   Reason for Stopping:             Outpatient Discharge Orders   Discharge Diet     Activity as tolerated     Lifting restrictions     No driving until     Call provider for:  persistent nausea or vomiting     Call provider for:  severe uncontrolled pain     Call provider for:  redness, tenderness, or signs of infection (pain, swelling, redness, odor or green/yellow discharge around incision site)     Call provider for: active or persistent bleeding     Call provider for:  difficulty breathing, headache or visual disturbances     Call provider for:  hives     Call provider for:  persistent dizziness or light-headedness     Call provider for:  extreme fatigue          ED Provider  Electronically Signed by     Shalonda Garcia DO  11/10/22 7702

## 2022-11-10 NOTE — OP NOTE
OPERATIVE REPORT  PATIENT NAME: Yarelis Skelton    :  1990  MRN: [id number]  Pt Location: [facility] OR ROOM 06    SURGERY DATE: 11/10/2022    Surgeon(s) and Role:     * Lupe Wallace MD - Primary     * Dante Carrizales MD - Assisting    Preop Diagnosis:  Acute cholecystitis due to biliary calculus [K80 00]    Post-Op Diagnosis Codes:     * Acute cholecystitis due to biliary calculus [K80 00]    Procedure(s) (LRB):  CHOLECYSTECTOMY LAPAROSCOPIC (N/A)    Specimen(s):  ID Type Source Tests Collected by Time Destination   1 :  Tissue Gallbladder TISSUE EXAM Lupe Wallace MD 11/10/2022 1532        Estimated Blood Loss:   Minimal    Drains:  * No LDAs found *    Anesthesia Type:   General    Operative Indications:  Acute cholecystitis due to biliary calculus [K80 00]      Operative Findings:  Acute Calculous Cholecystitis with distended gallbladder and pericholecystic fluid    Complications:   None    Procedure and Technique:    Patient was identified in the preoperative holding area and taken back to the operating room  The patient was positioned supine on the operating room table  General anesthesia was induced and patient was prepped and draped in the standard sterile surgical fashion  A preoperative time-out was held confirming the correct patient, correct procedure and that all necessary equipment and personnel were present within the operating room  Preoperative antibiotics were administered prior to incision  An incision was made at the umbilicus and a Veress needle was then inserted into the abdomen  The abdomen was then insufflated to 15 mmHg  The Veress needle was then removed and a 5 mm port placed at the umbilicus  The laparoscope was inserted into the port  Intra-abdominal contents were inspected and there was no trauma from port or needle placement   An 11 mm port was then placed in the epigastric region and two 5 mm ports placed along the right costal margin under direct visualization, in similar fashion  The gallbladder was grasped with an atraumatic grasper and elevated cephalad above the liver  Omental adhesions were then taken down bluntly and with judicious electrocautery  The peritoneum of the neck of the gallbladder was then opened with electrocautery and blunt dissection  This was done circumferentially down to the liver bed laterally and medially  The cystic duct was identified as the anterior most tubular structure at the gallbladder neck that was clearly directly entering the gallbladder  The [place] dissector was used to circumferentially dissect the cystic duct free from surrounding tissue  Attention was then turned to the cystic artery which was medial to the cystic duct and this was circumferentially dissected  The posterior aspect of the gallbladder was then cleared of fatty tissue and peritoneal attachments so that the liver bed was visible, thus obtaining a critical view  2 hemoclips were placed proximally and 1 clip distally on the cystic artery and this was divided  with [facility]  3 clips were placed proximally and 1 clip placed distally on the cystic duct and this was divided with [facility]  Dissection of the gallbladder was continued with electrocautery until the gallbladder was freed from the liver bed  Once the gallbladder was freed it was placed in an Endo-Catch bag and removed from the abdomen  Examination the liver bed was performed and any bleeding points were controlled with electrocautery  Focused irrigation and suction of the gallbladder fossa was performed   The clips were re-examined and confirmed to be secure without any bilious drainage or bleeding  The 11 mm port was then removed and an 0 Vicryl suture on a closure device was used to close the fascia of the port site  The remaining ports were then removed and the abdomen was allowed to desufflate    Local anesthetic was injected around each of the port sites and the skin then closed with 4 0 Monocryl suture in subcuticular fashion  Skin glue was then applied  The patient was then awoken from general anesthesia and taken to the postoperative care unit in good condition  All counts were correct at the end of the case      Dr Dahlia Lacey was present for the entire procedure    Patient Disposition:  PACU         SIGNATURE: Jaylon Rodrigues MD  DATE: November 10, 2022  TIME: 3:55 PM

## 2022-11-10 NOTE — H&P
General Surgery  History and Physical  Yanelis Weber 33 y o  female MRN: [id number]  Unit/Bed#: ED 20 Encounter: [id number]    Assessment:  30yo F with acute cholecystitis, no evidence of choledocholithiasis    Afebrile, VSS  WBC 14 63  Normal LFTs, normal Tbili  11/10 CT AP w/ IV: Mild GB distention, trace perichoelcystic fluid, no calcified cholelithiasis  Mild CBD dilation 8mm  11/10 RUQ US: Mild GB distention, top normal GB wall thickening, trace pericholecystic fluid, equivocal for acute cholecystitis  Plan:  - plan for lap ariel today  - NPO for procedure  - start ancef/flagyl  - pain and nausea control  - mIVF  - please tigertext the surgery resident or AP role with any questions      History of Present Illness   HPI: Yanelis Weber is a 33 y o  female who presents with abdominal pain that started last night  Pain is worse this morning, now radiating to the back  Endorses nausea, vomited twice this morning  Vomited shortly after drinking sips of water this morning  Last meal was 6PM last night then pain started around 10PM  Of note, the patient's PSHx notable for sleeve gastrectomy 2022  Denies fever, chills  Last meal was last night  Review of Systems   Constitutional: Negative for appetite change, chills and fever  HENT: Negative  Respiratory: Negative  Cardiovascular: Negative  Gastrointestinal: Positive for abdominal pain, nausea and vomiting  Negative for constipation  Genitourinary: Negative  Musculoskeletal: Negative  Skin: Negative  Neurological: Negative          Historical Information   Past Medical History:   Diagnosis Date   • Anxiety    • Depression    • Disease of thyroid gland    • Fibromyalgia, primary     Hashimoto's   • Obese     gastric sleeve today 2022   • PONV (postoperative nausea and vomiting)      Past Surgical History:   Procedure Laterality Date   •  SECTION     • DILATION AND CURETTAGE OF UTERUS     • EGD     • IN LAP, EWA RESTRICT PROC, LONGITUDINAL GASTRECTOMY N/A 2022    Procedure: LAP SLEEVE GASTRECTOMY W/ ROBOT;  Surgeon: Tristan Riley MD;  Location: [facility];  Service: Bariatrics   • TONSILLECTOMY     • WISDOM TOOTH EXTRACTION       Social History   Social History     Substance and Sexual Activity   Alcohol Use Not Currently   • Alcohol/week: 2 0 standard drinks   • Types: 1 Glasses of wine, 1 Shots of liquor per week    Comment: rare    4 x  yearly     Social History     Substance and Sexual Activity   Drug Use Never     Social History     Tobacco Use   Smoking Status Never Smoker   Smokeless Tobacco Never Used     Family History: non-contributory    Meds/Allergies   PTA meds:   Prior to Admission Medications   Prescriptions Last Dose Informant Patient Reported? Taking?    Calcium 500-100 MG-UNIT CHEW   Yes No   Sig: Chew   Multiple Vitamins-Minerals (VITAMIN D3 COMPLETE PO)  Self Yes Yes   Sig: Take by mouth     VITAMIN D PO   Yes Yes   Sig: Take 2,000 Units by mouth daily in the early morning   enoxaparin (Lovenox) 40 mg/0 4 mL   No No   Sig: Inject 0 4 mL (40 mg total) under the skin in the morning for 13 days   Patient not taking: Reported on 2022   gabapentin (NEURONTIN) 300 mg capsule   Yes Yes   Si mg   levothyroxine 125 mcg tablet  Self Yes Yes   Sig: Take 125 mcg by mouth daily   omeprazole (PriLOSEC) 20 mg delayed release capsule   No No   Sig: TAKE 1 CAPSULE BY MOUTH EVERY DAY   Patient not taking: Reported on 2022   oxyCODONE (Roxicodone) 5 immediate release tablet Not Taking at Unknown time  No No   Sig: Take 1 tablet (5 mg total) by mouth every 4 (four) hours as needed for moderate pain Max Daily Amount: 30 mg   Patient not taking: No sig reported   sertraline (ZOLOFT) 50 mg tablet Not Taking at Unknown time  Yes No   Si mg daily   Patient not taking: No sig reported      Facility-Administered Medications: None     Allergies   Allergen Reactions   • Clindamycin Anaphylaxis       Objective   First Vitals:   Blood Pressure: 99/57 (11/10/22 0147)  Pulse: (!) 48 (11/10/22 0147)  Temperature: (!) 97 °F (36 1 °C) (11/10/22 0147)  Temp Source: Temporal (11/10/22 0147)  Respirations: 14 (11/10/22 0147)  Weight - Scale: 80 6 kg (177 lb 11 1 oz) (11/10/22 0145)  SpO2: 100 % (11/10/22 0147)    Current Vitals:   Blood Pressure: 100/56 (11/10/22 1229)  Pulse: (!) 49 (11/10/22 1229)  Temperature: (!) 97 °F (36 1 °C) (11/10/22 0147)  Temp Source: Temporal (11/10/22 0147)  Respirations: 14 (11/10/22 1229)  Weight - Scale: 80 6 kg (177 lb 11 1 oz) (11/10/22 0145)  SpO2: 98 % (11/10/22 1229)      Intake/Output Summary (Last 24 hours) at 11/10/2022 1247  Last data filed at 11/10/2022 0435  Gross per 24 hour   Intake 1000 ml   Output --   Net 1000 ml       Invasive Devices  Report    Peripheral Intravenous Line  Duration           Peripheral IV 11/10/22 Left Antecubital <1 day                Physical Exam:  General: No acute distress  Neuro: alert and oriented  HEENT: moist mucous membranes  CV: Well perfused, regular rate and rhythm  Lungs: Normal work of breathing, no increased respiratory effort  Abdomen: Soft, tender RUQ, non-distended  Well healed scars from previous lap sleeve gastrectomy  +Glen  Extremities: No edema, clubbing or cyanosis  Skin: Warm, dry      Lab Results:   CBC:   Lab Results   Component Value Date    WBC 14 63 (H) 11/10/2022    HGB 12 1 11/10/2022    HCT 36 1 11/10/2022    MCV 91 11/10/2022     11/10/2022    MCH 30 5 11/10/2022    MCHC 33 5 11/10/2022    RDW 13 1 11/10/2022    MPV 10 0 11/10/2022    NRBC 0 11/10/2022   , CMP:   Lab Results   Component Value Date    SODIUM 140 11/10/2022    K 3 7 11/10/2022     11/10/2022    CO2 30 11/10/2022    BUN 23 11/10/2022    CREATININE 0 92 11/10/2022    CALCIUM 9 0 11/10/2022    AST  11/10/2022      Comment:      Specimen collection should occur prior to Sulfasalazine administration due to the potential for falsely depressed results       ALT 29 11/10/2022    ALKPHOS 70 11/10/2022    EGFR 82 11/10/2022     Imaging: I have personally reviewed pertinent reports  EKG, Pathology, and Other Studies: I have personally reviewed pertinent reports  Code Status: Level 1 - Full Code  Advance Directive and Living Will:      Power of :    POLST:      Counseling / Coordination of Care  Total floor / unit time spent today 20 minutes  This involved direct patient contact where I performed a full history and physical, reviewed previous records, and reviewed laboratory and other diagnostic studies  Greater than 50% of total time was spent with the patient and / or family counseling and / or coordination of care      Radha Burkett MD  General Surgery PGY1  11/10/2022

## 2022-11-10 NOTE — PLAN OF CARE
Problem: PAIN - ADULT  Goal: Verbalizes/displays adequate comfort level or baseline comfort level  Description: Interventions:  - Encourage patient to monitor pain and request assistance  - Assess pain using appropriate pain scale  - Administer analgesics based on type and severity of pain and evaluate response  - Implement non-pharmacological measures as appropriate and evaluate response  - Consider cultural and social influences on pain and pain management  - Notify physician/advanced practitioner if interventions unsuccessful or patient reports new pain  11/10/2022 1439 by Pillo Hill RN  Outcome: Progressing  11/10/2022 1439 by Pillo Hill RN  Outcome: Progressing     Problem: INFECTION - ADULT  Goal: Absence or prevention of progression during hospitalization  Description: INTERVENTIONS:  - Assess and monitor for signs and symptoms of infection  - Monitor lab/diagnostic results  - Monitor all insertion sites, i e  indwelling lines, tubes, and drains  - Monitor endotracheal if appropriate and nasal secretions for changes in amount and color  - [place] appropriate cooling/warming therapies per order  - Administer medications as ordered  - Instruct and encourage patient and family to use good hand hygiene technique  - Identify and instruct in appropriate isolation precautions for identified infection/condition  11/10/2022 1439 by Pillo Hill RN  Outcome: Progressing  11/10/2022 1439 by Pillo Hill RN  Outcome: Progressing  Goal: Absence of fever/infection during neutropenic period  Description: INTERVENTIONS:  - Monitor WBC    11/10/2022 1439 by Pillo Hill RN  Outcome: Progressing  11/10/2022 1439 by Pillo Hill RN  Outcome: Progressing     Problem: SAFETY ADULT  Goal: Patient will remain free of falls  Description: INTERVENTIONS:  - Educate patient/family on patient safety including physical limitations  - Instruct patient to call for assistance with activity   - Consult OT/PT to assist with strengthening/mobility   - Keep Call bell within reach  - Keep bed low and locked with side rails adjusted as appropriate  - Keep care items and personal belongings within reach  - Initiate and maintain comfort rounds  - Make Fall Risk Sign visible to staff  - Apply yellow socks and bracelet for high fall risk patients  - Consider moving patient to room near nurses station  11/10/2022 1439 by Anyi Harp RN  Outcome: Progressing  11/10/2022 1439 by Anyi Harp RN  Outcome: Progressing  Goal: Maintain or return to baseline ADL function  Description: INTERVENTIONS:  -  Assess patient's ability to carry out ADLs; assess patient's baseline for ADL function and identify physical deficits which impact ability to perform ADLs (bathing, care of mouth/teeth, toileting, grooming, dressing, etc )  - Assess/evaluate cause of self-care deficits   - Assess range of motion  - Assess patient's mobility; develop plan if impaired  - Assess patient's need for assistive devices and provide as appropriate  - Encourage maximum independence but intervene and supervise when necessary  - Involve family in performance of ADLs  - Assess for home care needs following discharge   - Consider OT consult to assist with ADL evaluation and planning for discharge  - Provide patient education as appropriate  11/10/2022 1439 by Anyi Harp RN  Outcome: Progressing  11/10/2022 1439 by Anyi Harp RN  Outcome: Progressing  Goal: Maintains/Returns to pre admission functional level  Description: INTERVENTIONS:  - Perform BMAT or MOVE assessment daily    - Set and communicate daily mobility goal to care team and patient/family/caregiver     - Collaborate with rehabilitation services on mobility goals if consulted  - Out of bed for toileting  - Record patient progress and toleration of activity level   11/10/2022 1439 by Anyi Harp RN  Outcome: Progressing  11/10/2022 1439 by Anyi Harp RN  Outcome: Progressing     Problem: DISCHARGE PLANNING  Goal: Discharge to home or other facility with appropriate resources  Description: INTERVENTIONS:  - Identify barriers to discharge w/patient and caregiver  - Arrange for needed discharge resources and transportation as appropriate  - Identify discharge learning needs (meds, wound care, etc )  - Arrange for interpretive services to assist at discharge as needed  - Refer to Case Management Department for coordinating discharge planning if the patient needs post-hospital services based on physician/advanced practitioner order or complex needs related to functional status, cognitive ability, or social support system  11/10/2022 1439 by Yunier Sauceda RN  Outcome: Progressing  11/10/2022 1439 by Yunier Sauceda RN  Outcome: Progressing     Problem: Knowledge Deficit  Goal: Patient/family/caregiver demonstrates understanding of disease process, treatment plan, medications, and discharge instructions  Description: Complete learning assessment and assess knowledge base    Interventions:  - Provide teaching at level of understanding  - Provide teaching via preferred learning methods  11/10/2022 1439 by Yunier Sauceda RN  Outcome: Progressing  11/10/2022 1439 by Yunier Sauceda RN  Outcome: Progressing

## 2022-11-10 NOTE — ANESTHESIA PREPROCEDURE EVALUATION
Procedure:  CHOLECYSTECTOMY LAPAROSCOPIC (N/A Abdomen)    Relevant Problems   ANESTHESIA   (+) PONV (postoperative nausea and vomiting)      GI/HEPATIC   (+) Bariatric surgery status      MUSCULOSKELETAL   (+) Fibromyalgia, primary      NEURO/PSYCH   (+) Depression   (+) Fibromyalgia, primary        Physical Exam    Airway    Mallampati score: II  TM Distance: >3 FB  Neck ROM: full     Dental       Cardiovascular  Rhythm: regular, Rate: normal, Cardiovascular exam normal    Pulmonary  Pulmonary exam normal Breath sounds clear to auscultation,     Other Findings        Anesthesia Plan  ASA Score- 2     Anesthesia Type- general with ASA Monitors  Additional Monitors:   Airway Plan: ETT  Plan Factors-Exercise tolerance (METS): >4 METS  Chart reviewed  Existing labs reviewed  Patient is not a current smoker  Obstructive sleep apnea risk education given perioperatively  Induction- intravenous  Postoperative Plan-     Informed Consent- Anesthetic plan and risks discussed with patient

## 2022-11-10 NOTE — ED PROVIDER NOTES
History  Chief Complaint   Patient presents with   • Abdominal Pain     Ruq pain that started around 10pm pt also nausea and vomiting and pain to lower mid back  Recent gastric sleeve sx   This is a 77-year-old female with a history of fibromyalgia, hypothyroidism, status post sleeve gastrectomy in 2022 who presents with right upper quadrant abdominal pain  At around 10:00 p m  This evening, patient was sitting at her desk when she started to experience a sudden onset sharp right upper quadrant abdominal pain  After onset, patient went to lay down and started to feel little bit better  However, as the night went on, pain continued to worsen  Pain is associated with several episodes of nonbloody, nonbilious vomiting  Patient does note several episodes of nonbloody, nonmelanotic diarrhea yesterday  No known sick contacts  No history of gallstones, kidney stones, alcohol abuse  She has not taken anything for the pain  No alleviating or exacerbating factors  She has never experienced this type of pain before  Prior to Admission Medications   Prescriptions Last Dose Informant Patient Reported? Taking?    Calcium 500-100 MG-UNIT CHEW   Yes No   Sig: Chew   Multiple Vitamins-Minerals (VITAMIN D3 COMPLETE PO)  Self Yes Yes   Sig: Take by mouth     VITAMIN D PO   Yes Yes   Sig: Take 2,000 Units by mouth daily in the early morning   enoxaparin (Lovenox) 40 mg/0 4 mL   No No   Sig: Inject 0 4 mL (40 mg total) under the skin in the morning for 13 days   Patient not taking: Reported on 2022   gabapentin (NEURONTIN) 300 mg capsule   Yes Yes   Si mg   levothyroxine 125 mcg tablet  Self Yes Yes   Sig: Take 125 mcg by mouth daily   omeprazole (PriLOSEC) 20 mg delayed release capsule   No No   Sig: TAKE 1 CAPSULE BY MOUTH EVERY DAY   Patient not taking: Reported on 2022   oxyCODONE (Roxicodone) 5 immediate release tablet Not Taking at Unknown time  No No   Sig: Take 1 tablet (5 mg total) by mouth every 4 (four) hours as needed for moderate pain Max Daily Amount: 30 mg   Patient not taking: No sig reported   sertraline (ZOLOFT) 50 mg tablet Not Taking at Unknown time  Yes No   Si mg daily   Patient not taking: No sig reported      Facility-Administered Medications: None       Past Medical History:   Diagnosis Date   • Anxiety    • Depression    • Disease of thyroid gland    • Fibromyalgia, primary     Hashimoto's   • Obese     gastric sleeve today 2022   • PONV (postoperative nausea and vomiting)        Past Surgical History:   Procedure Laterality Date   •  SECTION     • CHOLECYSTECTOMY LAPAROSCOPIC N/A 11/10/2022    Procedure: CHOLECYSTECTOMY LAPAROSCOPIC;  Surgeon: Fabian Ceballos MD;  Location: [facility];  Service: General   • DILATION AND CURETTAGE OF UTERUS     • EGD     • IN LAP, EWA RESTRICT PROC, LONGITUDINAL GASTRECTOMY N/A 2022    Procedure: LAP SLEEVE GASTRECTOMY W/ ROBOT;  Surgeon: Manuel Bautista MD;  Location: [facility];  Service: Bariatrics   • TONSILLECTOMY     • WISDOM TOOTH EXTRACTION         Family History   Problem Relation Age of Onset   • Depression Mother    • Obesity Mother    • Hypertension Father    • Liver cancer Maternal Grandfather    • Diabetes Maternal Grandfather    • Colon cancer Paternal Grandfather      I have reviewed and agree with the history as documented      E-Cigarette/Vaping   • E-Cigarette Use Never User      E-Cigarette/Vaping Substances   • Nicotine No    • THC No    • CBD No    • Flavoring No    • Other No    • Unknown No      Social History     Tobacco Use   • Smoking status: Never Smoker   • Smokeless tobacco: Never Used   Vaping Use   • Vaping Use: Never used   Substance Use Topics   • Alcohol use: Not Currently     Alcohol/week: 2 0 standard drinks     Types: 1 Glasses of wine, 1 Shots of liquor per week     Comment: rare    4 x  yearly   • Drug use: Never       Review of Systems   Constitutional: Negative for chills and fever  HENT: Negative for rhinorrhea, sore throat and trouble swallowing  Eyes: Negative for photophobia and visual disturbance  Respiratory: Negative for cough, chest tightness and shortness of breath  Cardiovascular: Negative for chest pain, palpitations and leg swelling  Gastrointestinal: Positive for abdominal pain, diarrhea, nausea and vomiting  Negative for blood in stool  Endocrine: Negative for polyuria  Genitourinary: Negative for dysuria, flank pain, hematuria, vaginal bleeding and vaginal discharge  Musculoskeletal: Negative for back pain and neck pain  Skin: Negative for color change and rash  Allergic/Immunologic: Negative for immunocompromised state  Neurological: Negative for dizziness, weakness, light-headedness, numbness and headaches  All other systems reviewed and are negative  Physical Exam  Physical Exam  Constitutional:       General: She is not in acute distress  Appearance: Normal appearance  She is well-developed  HENT:      Mouth/Throat:      Pharynx: Uvula midline  Eyes:      Conjunctiva/sclera: Conjunctivae normal       Pupils: Pupils are equal, round, and reactive to light  Neck:      Thyroid: No thyroid mass or thyromegaly  Trachea: Trachea normal    Cardiovascular:      Rate and Rhythm: Regular rhythm  Bradycardia present  Heart sounds: Normal heart sounds  No murmur heard  Pulmonary:      Effort: Pulmonary effort is normal       Breath sounds: Normal breath sounds  Abdominal:      General: Bowel sounds are normal       Palpations: Abdomen is soft  Tenderness: There is abdominal tenderness in the right upper quadrant and epigastric area  There is no guarding or rebound  Skin:     General: Skin is warm and dry  Neurological:      Mental Status: She is alert  Psychiatric:         Speech: Speech normal          Behavior: Behavior normal  Behavior is cooperative  Thought Content:  Thought content normal  Vital Signs  ED Triage Vitals [11/10/22 0147]   Temperature Pulse Respirations Blood Pressure SpO2   (!) 97 °F (36 1 °C) (!) 48 14 99/57 100 %      Temp Source Heart Rate Source Patient Position - Orthostatic VS BP Location FiO2 (%)   Temporal Monitor Sitting Right arm --      Pain Score       6           Vitals:    11/10/22 1612 11/10/22 1627 11/10/22 1642 11/10/22 1704   BP: 131/71 124/71 137/77 127/83   Pulse: (!) 50 55 (!) 50 56   Patient Position - Orthostatic VS:             Visual Acuity      ED Medications  Medications   sodium chloride 0 9 % bolus 1,000 mL (0 mL Intravenous Stopped 11/10/22 0435)   morphine injection 8 mg (8 mg Intravenous Given 11/10/22 0334)   ondansetron (ZOFRAN) injection 4 mg (4 mg Intravenous Given 11/10/22 0334)   iohexol (OMNIPAQUE) 350 MG/ML injection (SINGLE-DOSE) 100 mL (100 mL Intravenous Given 11/10/22 0517)   iohexol (OMNIPAQUE) 240 MG/ML solution 10 mL (10 mL Oral Given 11/10/22 0517)   ondansetron (ZOFRAN) injection 4 mg (4 mg Intravenous Given 11/10/22 1611)       Diagnostic Studies  Results Reviewed     Procedure Component Value Units Date/Time    POCT pregnancy, urine [[id number]]  (Normal) Resulted: 11/10/22 0452    Lab Status: Final result Updated: 11/10/22 0452     EXT PREG TEST UR (Ref: Negative) negative     Control valid    Urine Macroscopic, POC [[id number]]  (Abnormal) Collected: 11/10/22 0448    Lab Status: Final result Specimen: Urine Updated: 11/10/22 0450     Color, UA Yellow     Clarity, UA Clear     pH, UA 6 5     Leukocytes, UA Negative     Nitrite, UA Negative     Protein, UA Negative mg/dl      Glucose, UA Negative mg/dl      Ketones, UA Trace mg/dl      Urobilinogen, UA 1 0 E U /dl      Bilirubin, UA Negative     Occult Blood, UA Negative     Specific Gravity, UA >=1 030    Narrative:      CLINITEK RESULT    Comprehensive metabolic panel [[id number]] Collected: 11/10/22 0331    Lab Status: Final result Specimen: Blood from Arm, Left Updated: 11/10/22 0412     Sodium 140 mmol/L      Potassium 3 7 mmol/L      Chloride 103 mmol/L      CO2 30 mmol/L      ANION GAP 7 mmol/L      BUN 23 mg/dL      Creatinine 0 92 mg/dL      Glucose 129 mg/dL      Calcium 9 0 mg/dL      AST --     ALT 29 U/L      Alkaline Phosphatase 70 U/L      Total Protein 7 6 g/dL      Albumin 3 9 g/dL      Total Bilirubin 0 62 mg/dL      eGFR 82 ml/min/1 73sq m     Narrative:      NO AST RESULTS IF NEEDED IT MUST BE ORDERED SEPARATELY  National Kid[employer] Foundation guidelines for Chronic Kidney Disease (CKD):   •  Stage 1 with normal or high GFR (GFR > 90 mL/min/1 73 square meters)  •  Stage 2 Mild CKD (GFR = 60-89 mL/min/1 73 square meters)  •  Stage 3A Moderate CKD (GFR = 45-59 mL/min/1 73 square meters)  •  Stage 3B Moderate CKD (GFR = 30-44 mL/min/1 73 square meters)  •  Stage 4 Severe CKD (GFR = 15-29 mL/min/1 73 square meters)  •  Stage 5 End Stage CKD (GFR <15 mL/min/1 73 square meters)  Note: GFR calculation is accurate only with a steady state creatinine    Protime-INR [[id number]]  (Normal) Collected: 11/10/22 0331    Lab Status: Final result Specimen: Blood from Arm, Left Updated: 11/10/22 0355     Protime 13 4 seconds      INR 1 02    APTT [[id number]]  (Normal) Collected: 11/10/22 0331    Lab Status: Final result Specimen: Blood from Arm, Left Updated: 11/10/22 0355     PTT 28 seconds     Lipase [[id number]]  (Normal) Collected: 11/10/22 0331    Lab Status: Final result Specimen: Blood from Arm, Left Updated: 11/10/22 0354     Lipase 115 u/L     CBC and differential [[id number]]  (Abnormal) Collected: 11/10/22 0331    Lab Status: Final result Specimen: Blood from Arm, Left Updated: 11/10/22 0337     WBC 14 63 Thousand/uL      RBC 3 97 Million/uL      Hemoglobin 12 1 g/dL      Hematocrit 36 1 %      MCV 91 fL      MCH 30 5 pg      MCHC 33 5 g/dL      RDW 13 1 %      MPV 10 0 fL      Platelets 411 Thousands/uL      nRBC 0 /100 WBCs      Neutrophils Relative 86 %      Immat GRANS % 1 %      Lymphocytes Relative 9 %      Monocytes Relative 4 %      Eosinophils Relative 0 %      Basophils Relative 0 %      Neutrophils Absolute 12 59 Thousands/µL      Immature Grans Absolute 0 08 Thousand/uL      Lymphocytes Absolute 1 28 Thousands/µL      Monocytes Absolute 0 59 Thousand/µL      Eosinophils Absolute 0 04 Thousand/µL      Basophils Absolute 0 05 Thousands/µL                  US right upper quadrant   ED Interpretation by Arvella Curling, DO (11/10 0924)   See below      Final Result by Randal Sparrow MD (09/16 8165)      Mildly dilated gallbladder containing gallstones with top normal caliber gallbladder wall and trace pericholecystic fluid, but no sonographic Humphreys's sign, equivocal for acute cholecystitis  HIDA scan can be obtained for further evaluation  The study was marked in [facility] for immediate notification  Workstation performed: [id number]         CT abdomen pelvis with contrast   Final Result by Randal Sparrow MD (11/06 0999)      1  Mildly distended gallbladder with trace pericholecystic fluid, but without calcified cholelithiasis  Mild central intrahepatic and common bile duct dilatation with normal tapering distally  No calcific choledocholithiasis  Correlate with right    upper quadrant pain and ultrasound  2   Mild ascending and proximal transverse colonic wall thickening, which may be due to underdistention versus mild colitis in the appropriate clinical setting  The study was marked in [facility] for immediate notification  Workstation performed: [id number]                    Procedures  Procedures     Bedside right upper quadrant ultrasound performed and interpreted by myself reveals a mildly dilated gallbladder with gallstones  Gallbladder wall measuring approximately 2 1 mm  No pericholecystic fluid  Common bile duct dilated at approximately 6 1 mm  No sonographic Humphreys sign                                 ED Course  ED Course as of 11/13/22 1841   u Nov 10, 2022   0603 TT sent to gen surg resident  3231 Gen surg resident recommends reaching out to bariatrics  TT sent to bariatrics fellow  Αμαλίας 28 recommending formal ruq us  SBIRT 22yo+    Flowsheet Row Most Recent Value   SBIRT (23 yo +)    In order to provide better care to our patients, we are screening all of our patients for alcohol and drug use  Would it be okay to ask you these screening questions? Yes Filed at: 11/10/2022 8573   Initial Alcohol Screen: US AUDIT-C     1  How often do you have a drink containing alcohol? 0 Filed at: 11/10/2022 0309   2  How many drinks containing alcohol do you have on a typical day you are drinking? 0 Filed at: 11/10/2022 0309   3b  FEMALE Any Age, or MALE 65+: How often do you have 4 or more drinks on one occassion? 0 Filed at: 11/10/2022 0309   Audit-C Score 0 Filed at: 11/10/2022 7634   SAMMI: How many times in the past year have you    Used an illegal drug or used a prescription medication for non-medical reasons? Never Filed at: 11/10/2022 0309                    [facility]  Number of Diagnoses or Management Options  Diagnosis management comments: Will check labs, urinalysis, urine pregnancy  CT abdomen/pelvis with contrast   Fluids, Zofran, analgesia  Disposition pending results        Disposition  Final diagnoses:   Abdominal pain   Acute cholecystitis     Time reflects when diagnosis was documented in both MDM as applicable and the Disposition within this note     Time User Action Codes Description Comment    11/10/2022 10:33 AM Glen Lincoln Add [K80 00] Acute cholecystitis due to biliary calculus     11/10/2022 11:57 AM Shannon Guadalupe Add [R10 9] Abdominal pain     11/10/2022 11:57 AM Shannon Guadalupe [K81 0] Acute cholecystitis     11/10/2022  3:32 PM Brady Skelton Modify [K80 00] Acute cholecystitis due to biliary calculus       ED Disposition     ED Disposition   Send to OR Condition   --    Date/Time   Thu Nov 10, 2022 11:57 AM    Comment   Dr Sharad Napoles accepted per surgery resident at [phone]         Follow-up Information     Follow up With Specialties Details Why Contact Info    Archie Mendoza MD General Surgery Follow up in 2 week(s)  [address]  [phone]            Discharge Medication List as of 11/10/2022  6:34 PM      CONTINUE these medications which have CHANGED    Details   oxyCODONE (ROXICODONE) 5 immediate release tablet Take 1 tablet (5 mg total) by mouth every 4 (four) hours as needed for moderate pain or severe pain for up to 10 days Max Daily Amount: 30 mg, Starting Thu 11/10/2022, Until Sun 11/20/2022 at 2359, Normal         CONTINUE these medications which have NOT CHANGED    Details   Calcium 500-100 MG-UNIT CHEW Chew, Historical Med      gabapentin (NEURONTIN) 300 mg capsule 300 mg, Starting Wed 10/19/2022, Until Tue 1/17/2023 at 2359, Historical Med      levothyroxine 125 mcg tablet Take 125 mcg by mouth daily, Starting Thu 12/16/2021, Historical Med      Multiple Vitamins-Minerals (VITAMIN D3 COMPLETE PO) Take by mouth  , Historical Med      omeprazole (PriLOSEC) 20 mg delayed release capsule TAKE 1 CAPSULE BY MOUTH EVERY DAY, Normal      sertraline (ZOLOFT) 50 mg tablet 50 mg daily, Starting Tue 8/31/2021, Historical Med      VITAMIN D PO Take 2,000 Units by mouth daily in the early morning, Historical Med         STOP taking these medications       enoxaparin (Lovenox) 40 mg/0 4 mL Comments:   Reason for Stopping:               Outpatient Discharge Orders   Discharge Diet     Activity as tolerated     Lifting restrictions     No driving until     Call provider for:  persistent nausea or vomiting     Call provider for:  severe uncontrolled pain     Call provider for:  redness, tenderness, or signs of infection (pain, swelling, redness, odor or green/yellow discharge around incision site)     Call provider for: active or persistent bleeding     Call provider for:  difficulty breathing, headache or visual disturbances     Call provider for:  hives     Call provider for:  persistent dizziness or light-headedness     Call provider for:  extreme fatigue       PDMP Review       Value Time User    PDMP Reviewed  Yes 5/31/2022  8:56 AM Mihir Teran PA-C          ED Provider  Electronically Signed by           Charlie Mejia MD  11/13/22 6073

## 2022-11-10 NOTE — H&P
H&P - General Surgery  : [facility] Surgery Resident role on [employer]  Leigh S Jimmie Ayala 33 y o  female MRN: [id number]  Unit/Bed#: ED 20 Encounter: [id number]        Assessment:  33y o  year old female with acute cholecystitis    Plan:  NPO    Ancef/Flagyl  OR for lap ariel today  SQH dvt ppx    HPI:  Leigh S Jimmie Ayala is a 33 y o  female with a history of sleeve gastrectomy (2022),  (), hypothyroidism, GERD  Presents with RUQ abdominal pain, nausea/emesis since after dinner last night around 10pm  Denies other abdominal symptoms  Physical Exam  Constitutional:       General: She is not in acute distress  Appearance: Normal appearance  HENT:      Head: Normocephalic and atraumatic  Right Ear: External ear normal       Left Ear: External ear normal       Nose: Nose normal       Mouth/Throat:      Mouth: Mucous membranes are moist       Pharynx: Oropharynx is clear  Eyes:      General:         Right eye: No discharge  Left eye: No discharge  Extraocular Movements: Extraocular movements intact  Conjunctiva/sclera: Conjunctivae normal       Pupils: Pupils are equal, round, and reactive to light  Cardiovascular:      Rate and Rhythm: Normal rate  Pulses: Normal pulses  Heart sounds: Normal heart sounds  Pulmonary:      Effort: Pulmonary effort is normal  No respiratory distress  Abdominal:      General: Abdomen is flat  There is no distension  Palpations: Abdomen is soft  Tenderness: There is abdominal tenderness (Mild RUQ, + Humphreys's sign)  There is no guarding or rebound  Musculoskeletal:         General: No swelling, tenderness or signs of injury  Cervical back: Normal range of motion and neck supple  No rigidity or tenderness  Skin:     Coloration: Skin is not jaundiced  Findings: No lesion  Neurological:      General: No focal deficit present  Mental Status: She is alert and oriented to person, place, and time  Mental status is at baseline  Psychiatric:         Mood and Affect: Mood normal          Behavior: Behavior normal             Review of Systems   Constitutional: Negative for chills and fever  HENT: Negative for ear pain and sore throat  Eyes: Negative for pain and visual disturbance  Respiratory: Negative for cough and shortness of breath  Cardiovascular: Negative for chest pain and palpitations  Gastrointestinal: Positive for abdominal pain, nausea and vomiting  Genitourinary: Negative for dysuria and hematuria  Musculoskeletal: Negative for arthralgias and back pain  Skin: Negative for color change and rash  Neurological: Negative for seizures and syncope  All other systems reviewed and are negative  Objective         Intake/Output Summary (Last 24 hours) at 11/10/2022 1056  Last data filed at 11/10/2022 0435  Gross per 24 hour   Intake 1000 ml   Output --   Net 1000 ml       First Vitals:   Blood Pressure: 99/57 (11/10/22 0147)  Pulse: (!) 48 (11/10/22 0147)  Temperature: (!) 97 °F (36 1 °C) (11/10/22 0147)  Temp Source: Temporal (11/10/22 0147)  Respirations: 14 (11/10/22 0147)  Weight - Scale: 80 6 kg (177 lb 11 1 oz) (11/10/22 0145)  SpO2: 100 % (11/10/22 0147)    Current Vitals:   Blood Pressure: (!) 95/2 (11/10/22 0938)  Pulse: (!) 52 (11/10/22 0938)  Temperature: (!) 97 °F (36 1 °C) (11/10/22 0147)  Temp Source: Temporal (11/10/22 0147)  Respirations: 16 (11/10/22 0938)  Weight - Scale: 80 6 kg (177 lb 11 1 oz) (11/10/22 0145)  SpO2: 99 % (11/10/22 0938)    Invasive Devices  Report    Peripheral Intravenous Line  Duration           Peripheral IV 11/10/22 Left Antecubital <1 day                Imaging: I have personally reviewed pertinent reports        US right upper quadrant    Result Date: 11/10/2022  Impression: Mildly dilated gallbladder containing gallstones with top normal caliber gallbladder wall and trace pericholecystic fluid, but no sonographic Humphreys's sign, equivocal for acute cholecystitis  HIDA scan can be obtained for further evaluation  The study was marked in [facility] for immediate notification  Workstation performed: [id number]     CT abdomen pelvis with contrast    Result Date: 11/10/2022  Impression: 1  Mildly distended gallbladder with trace pericholecystic fluid, but without calcified cholelithiasis  Mild central intrahepatic and common bile duct dilatation with normal tapering distally  No calcific choledocholithiasis  Correlate with right upper quadrant pain and ultrasound  2   Mild ascending and proximal transverse colonic wall thickening, which may be due to underdistention versus mild colitis in the appropriate clinical setting  The study was marked in [facility] for immediate notification  Workstation performed: [id number]       EKG, Pathology, and Other Studies: I have personally reviewed pertinent reports      VTE Pharmacologic Prophylaxis: Heparin  VTE Mechanical Prophylaxis: sequential compression device    Historical Information   Past Medical History:   Diagnosis Date   • Anxiety    • Depression    • Disease of thyroid gland    • Fibromyalgia, primary     Hashimoto's   • Obese     gastric sleeve today 2022   • PONV (postoperative nausea and vomiting)      Past Surgical History:   Procedure Laterality Date   •  SECTION     • DILATION AND CURETTAGE OF UTERUS     • EGD     • AZ LAP, EWA RESTRICT PROC, LONGITUDINAL GASTRECTOMY N/A 2022    Procedure: LAP SLEEVE GASTRECTOMY W/ ROBOT;  Surgeon: Shane Li MD;  Location: [facility];  Service: Bariatrics   • TONSILLECTOMY     • WISDOM TOOTH EXTRACTION       Social History   Social History     Substance and Sexual Activity   Alcohol Use Not Currently   • Alcohol/week: 2 0 standard drinks   • Types: 1 Glasses of wine, 1 Shots of liquor per week    Comment: rare    4 x  yearly     Social History     Substance and Sexual Activity   Drug Use Never     Social History     Tobacco Use   Smoking Status Never Smoker   Smokeless Tobacco Never Used     Family History   Problem Relation Age of Onset   • Depression Mother    • Obesity Mother    • Hypertension Father    • Liver cancer Maternal Grandfather    • Diabetes Maternal Grandfather    • Colon cancer Paternal Grandfather        Meds/Allergies   all current active meds have been reviewed, current meds:   Current Facility-Administered Medications   Medication Dose Route Frequency   • acetaminophen (TYLENOL) tablet 975 mg  975 mg Oral Q6H PRN   • ceFAZolin (ANCEF) IVPB (premix in dextrose) 2,000 mg 50 mL  2,000 mg Intravenous Q8H   • gabapentin (NEURONTIN) capsule 300 mg  300 mg Oral Daily   • heparin (porcine) subcutaneous injection 5,000 Units  5,000 Units Subcutaneous Q8H [address]   • HYDROmorphone (DILAUDID) injection 0 5 mg  0 5 mg Intravenous Q3H PRN   • lactated ringers infusion  125 mL/hr Intravenous Continuous   • levothyroxine tablet 125 mcg  125 mcg Oral Daily   • metroNIDAZOLE (FLAGYL) IVPB (premix) 500 mg 100 mL  500 mg Intravenous Q8H   • ondansetron (ZOFRAN) injection 4 mg  4 mg Intravenous Q4H PRN   • oxyCODONE (ROXICODONE) immediate release tablet 10 mg  10 mg Oral Q4H PRN   • oxyCODONE (ROXICODONE) IR tablet 5 mg  5 mg Oral Q4H PRN   • pantoprazole (PROTONIX) EC tablet 20 mg  20 mg Oral Early Morning    and PTA meds:   Prior to Admission Medications   Prescriptions Last Dose Informant Patient Reported? Taking?    Calcium 500-100 MG-UNIT CHEW   Yes No   Sig: Chew   Multiple Vitamins-Minerals (VITAMIN D3 COMPLETE PO)  Self Yes Yes   Sig: Take by mouth     VITAMIN D PO   Yes Yes   Sig: Take 2,000 Units by mouth daily in the early morning   enoxaparin (Lovenox) 40 mg/0 4 mL   No No   Sig: Inject 0 4 mL (40 mg total) under the skin in the morning for 13 days   Patient not taking: Reported on 2022   gabapentin (NEURONTIN) 300 mg capsule   Yes Yes   Si mg   levothyroxine 125 mcg tablet  Self Yes Yes   Sig: Take 125 mcg by mouth daily omeprazole (PriLOSEC) 20 mg delayed release capsule   No No   Sig: TAKE 1 CAPSULE BY MOUTH EVERY DAY   Patient not taking: Reported on 2022   oxyCODONE (Roxicodone) 5 immediate release tablet Not Taking at Unknown time  No No   Sig: Take 1 tablet (5 mg total) by mouth every 4 (four) hours as needed for moderate pain Max Daily Amount: 30 mg   Patient not taking: No sig reported   sertraline (ZOLOFT) 50 mg tablet Not Taking at Unknown time  Yes No   Si mg daily   Patient not taking: No sig reported      Facility-Administered Medications: None     Allergies   Allergen Reactions   • Clindamycin Anaphylaxis       Lab Results: I have personally reviewed pertinent lab results  , CBC:   Lab Results   Component Value Date    WBC 14 63 (H) 11/10/2022    HGB 12 1 11/10/2022    HCT 36 1 11/10/2022    MCV 91 11/10/2022     11/10/2022    MCH 30 5 11/10/2022    MCHC 33 5 11/10/2022    RDW 13 1 11/10/2022    MPV 10 0 11/10/2022    NRBC 0 11/10/2022   , CMP:   Lab Results   Component Value Date    SODIUM 140 11/10/2022    K 3 7 11/10/2022     11/10/2022    CO2 30 11/10/2022    BUN 23 11/10/2022    CREATININE 0 92 11/10/2022    CALCIUM 9 0 11/10/2022    AST  11/10/2022      Comment:      Specimen collection should occur prior to Sulfasalazine administration due to the potential for falsely depressed results  ALT 29 11/10/2022    ALKPHOS 70 11/10/2022    EGFR 82 11/10/2022       Counseling / Coordination of Care  Total floor / unit time spent today 25 minutes  Greater than 50% of total time was spent with the patient and / or family counseling and / or coordination of care        Sunita Rosales MD  11/10/2022 10:56 AM

## 2022-11-10 NOTE — ED NOTES
Ambulated steadily to the bathroom, reports she is much more comfortable     Cade Ashford RN  11/10/22 0586

## 2022-11-10 NOTE — QUICK NOTE
Post Op Check Note - Surgery Resident  Fatou Mack 33 y o  female MRN: [id number]  Unit/Bed#: E5 -02 Encounter: [id number]    ASSESSMENT:  Fatou Mack is a 33 y o  female who is status post lap appy    Subjective: No acute complaints, mild nausea but no emesis  Wants to eat some dinner and go home    Physical Exam:  GEN: NAD  CV: RRR  Lung: Normal effort  Ab: Soft, NT/ND  Neuro: A+Ox3  Incisions: CDI    Blood pressure 127/83, pulse 56, temperature 97 9 °F (36 6 °C), resp  rate 15, height 5' 3" (1 6 m), weight 80 6 kg (177 lb 11 1 oz), last menstrual period 11/03/2022, SpO2 95 %  ,Body mass index is 31 48 kg/m²        Intake/Output Summary (Last 24 hours) at 11/10/2022 1747  Last data filed at 11/10/2022 1557  Gross per 24 hour   Intake 2350 ml   Output --   Net 2350 ml       Invasive Devices  Report    Peripheral Intravenous Line  Duration           Peripheral IV 11/10/22 Left Antecubital <1 day                VTE Pharmacologic Prophylaxis: Heparin          3
no

## 2022-11-11 NOTE — ANESTHESIA POSTPROCEDURE EVALUATION
Post-Op Assessment Note    CV Status:  Stable  Pain Score: 2    Pain management: adequate     Mental Status:  Alert and awake   Hydration Status:  Euvolemic and stable   PONV Controlled:  Controlled   Airway Patency:  Patent      Post Op Vitals Reviewed: Yes      Staff: Anesthesiologist         No complications documented      BP      Temp      Pulse     Resp      SpO2      /83   Pulse 56   Temp 97 9 °F (36 6 °C)   Resp 15   Ht 5' 3" (1 6 m)   Wt 80 6 kg (177 lb 11 1 oz)   LMP 11/03/2022   SpO2 95%   BMI 31 48 kg/m²

## 2022-11-11 NOTE — UTILIZATION REVIEW
Initial Clinical Review    Admission: Date/Time/Statement:   Admission Orders (From admission, onward)     Ordered        11/10/22 1033  Inpatient Admission  Once                      Orders Placed This Encounter   Procedures   • Inpatient Admission     Standing Status:   Standing     Number of Occurrences:   1     Order Specific Question:   Level of Care     Answer:   Med Surg [16]     Order Specific Question:   Estimated length of stay     Answer:   More than 2 Midnights     Order Specific Question:   Certification     Answer:   I certify that inpatient services are medically necessary for this patient for a duration of greater than two midnights  See H&P and MD Progress Notes for additional information about the patient's course of treatment  ED Arrival Information     Expected   -    Arrival   11/10/2022 01:33    Acuity   Emergent            Means of arrival   Walk-In    Escorted by   Self    Service   Surgery-General    Admission type   Emergency            Arrival complaint   abdominal pain, back pain           Chief Complaint   Patient presents with   • Abdominal Pain     Ruq pain that started around 10pm pt also nausea and vomiting and pain to lower mid back  Recent gastric sleeve sx 06/22  Initial Presentation: 33 y o  female presents to ED from home with abdominal pain since the evening prior to admission  {ain worse the morning of admission,  Radiates to back  Has nausea and vomited twice the  Day of admission  Last meal  6 Pm the night before admission, pain started 4  Hours later  Had recent sleeve gastrectomy   6/22  U/S  RUQ  Shows acute cholecystitis  Ct abdomen shows mild  GB  Distention, mild  CBD dilation  Admit  Ip with  Acute  Cholecystitis and plan is  Surgical intervention       SURGERY DATE: 11/10/2022    Procedure(s) (LRB):  CHOLECYSTECTOMY LAPAROSCOPIC (N/A)  Operative Findings:  Acute Calculous Cholecystitis with distended gallbladder and pericholecystic fluid    11/10  D/C Home   1900       ED Triage Vitals [11/10/22 0147]   Temperature Pulse Respirations Blood Pressure SpO2   (!) 97 °F (36 1 °C) (!) 48 14 99/57 100 %      Temp Source Heart Rate Source Patient Position - Orthostatic VS BP Location FiO2 (%)   Temporal Monitor Sitting Right arm --      Pain Score       6          Wt Readings from Last 1 Encounters:   11/10/22 80 6 kg (177 lb 11 1 oz)     Additional Vital Signs:   97 9 °F (36 6 °C) 56 -- 127/83 98 95 % -- -- --    11/10/22 1642 97 5 °F (36 4 °C) 50 Abnormal  15 137/77 -- 97 % -- None (Room air) --   11/10/22 1627 97 5 °F (36 4 °C) 55 15 124/71 93 98 % -- None (Room air) --   11/10/22 1612 -- 50 Abnormal  20 131/71 95 95 % -- None (Room air) --   11/10/22 1557 97 6 °F (36 4 °C) 54 Abnormal  16 130/74 97 100 % 6 L/min Simple mask --   11/10/22 1229 -- 49 Abnormal  14 100/56 -- 98 % -- None (Room air) --   11/10/22 0938 -- 52 Abnormal  16 95/2 Abnormal  -- 99 % -- None (Room air) Lying   11/10/22 0845 -- 46 Abnormal  16 99/55 -- 100 % -- None (Room air) Lying   11/10/22 0728 -- 60 18 129/62 -- 100 % -- None (Room air) Lying   11/10/22 0623 -- 50 Abnormal  16 95/62 -- 97 % -- None (Room air) Lying   11/10/22 0445 -- 59 18 122/67 -- 99 % -- None (Room air) Lying   11/10/22 0400 -- 57 -- 87/53 Abnormal  -- 99 % -- None (Room air) Lying   11/10/22 0330 -- -- -- -- -- -- -- None (Room air) --   11/10/22 0148 -- -- -- 100/55 -- -- -- -- Sitting   11/10/22 0147 97 °F (36 1 °C) Abnormal  48 Abnormal  14 99/57 -- 100 % -- None (Room air) Sitting       Pertinent Labs/Diagnostic Test Results:   US right upper quadrant   ED Interpretation by Marianne Cadet DO (11/10 6066)   See below      Final Result by Di Jordan MD (11/10 2506)      Mildly dilated gallbladder containing gallstones with top normal caliber gallbladder wall and trace pericholecystic fluid, but no sonographic Humphreys's sign, equivocal for acute cholecystitis  HIDA scan can be obtained for further evaluation  The study was marked in [facility] for immediate notification  Workstation performed: [id number]         CT abdomen pelvis with contrast   Final Result by Chetna Marcus MD (77/31 7739)      1  Mildly distended gallbladder with trace pericholecystic fluid, but without calcified cholelithiasis  Mild central intrahepatic and common bile duct dilatation with normal tapering distally  No calcific choledocholithiasis  Correlate with right    upper quadrant pain and ultrasound  2   Mild ascending and proximal transverse colonic wall thickening, which may be due to underdistention versus mild colitis in the appropriate clinical setting  The study was marked in [facility] for immediate notification        Workstation performed: [id number]               Results from last 7 days   Lab Units 11/10/22  0331   WBC Thousand/uL 14 63*   HEMOGLOBIN g/dL 12 1   HEMATOCRIT % 36 1   PLATELETS Thousands/uL 243   NEUTROS ABS Thousands/µL 12 59*         Results from last 7 days   Lab Units 11/10/22  0331   SODIUM mmol/L 140   POTASSIUM mmol/L 3 7   CHLORIDE mmol/L 103   CO2 mmol/L 30   ANION GAP mmol/L 7   BUN mg/dL 23   CREATININE mg/dL 0 92   EGFR ml/min/1 73sq m 82   CALCIUM mg/dL 9 0     Results from last 7 days   Lab Units 11/10/22  0331   ALT U/L 29   ALK PHOS U/L 70   TOTAL PROTEIN g/dL 7 6   ALBUMIN g/dL 3 9   TOTAL BILIRUBIN mg/dL 0 62         Results from last 7 days   Lab Units 11/10/22  0331   GLUCOSE RANDOM mg/dL 129                   Results from last 7 days   Lab Units 11/10/22  0331   PROTIME seconds 13 4   INR  1 02   PTT seconds 28               Results from last 7 days   Lab Units 11/10/22  0331   LIPASE u/L 115                 Results from last 7 days   Lab Units 11/10/22  0448   CLARITY UA  Clear   COLOR UA  Yellow   SPEC GRAV UA  >=1 030   PH UA  6 5   GLUCOSE UA mg/dl Negative   KETONES UA mg/dl Trace*   BLOOD UA  Negative   PROTEIN UA mg/dl Negative   NITRITE UA  Negative   BILIRUBIN UA  Negative UROBILINOGEN UA E U /dl 1 0   LEUKOCYTES UA  Negative         ED Treatment:   Medication Administration from 11/10/2022 0133 to 11/10/2022 1342       Date/Time Order Dose Route Action Comments     11/10/2022 0435 sodium chloride 0 9 % bolus 1,000 mL 0 mL Intravenous Stopped      11/10/2022 0335 sodium chloride 0 9 % bolus 1,000 mL 1,000 mL Intravenous New Bag      11/10/2022 0334 morphine injection 8 mg 8 mg Intravenous Given      11/10/2022 0334 ondansetron (ZOFRAN) injection 4 mg 4 mg Intravenous Given      11/10/2022 0517 iohexol (OMNIPAQUE) 350 MG/ML injection (SINGLE-DOSE) 100 mL 100 mL Intravenous Given      11/10/2022 0517 iohexol (OMNIPAQUE) 240 MG/ML solution 10 mL 10 mL Oral Given      11/10/2022 1144 gabapentin (NEURONTIN) capsule 300 mg 300 mg Oral Not Given      11/10/2022 1145 levothyroxine tablet 125 mcg 125 mcg Oral Not Given      11/10/2022 1152 pantoprazole (PROTONIX) EC tablet 20 mg 20 mg Oral Given      11/10/2022 1149 lactated ringers infusion 125 mL/hr Intravenous New Bag      11/10/2022 1316 ceFAZolin (ANCEF) IVPB (premix in dextrose) 2,000 mg 50 mL 0 mg Intravenous Stopped      11/10/2022 1152 ceFAZolin (ANCEF) IVPB (premix in dextrose) 2,000 mg 50 mL 2,000 mg Intravenous New Bag      11/10/2022 1317 metroNIDAZOLE (FLAGYL) IVPB (premix) 500 mg 100 mL 500 mg Intravenous New Bag      11/10/2022 1158 metroNIDAZOLE (FLAGYL) IVPB (premix) 500 mg 100 mL 0 mg Intravenous Hold Hold until first antibiotic is done          Admitting Diagnosis: Acute cholecystitis [K81 0]  Abdominal pain [R10 9]  Acute cholecystitis due to biliary calculus [K80 00]  Age/Sex: 33 y o  female  Admission Orders:  Scheduled Medications:  IV  Ancef  Q 8 hrs  IV  Flagyl Q 8 hrs  neurontin daily  SQ heparin Q 8 hrs  Synthroid daily  protonix dailt          Continuous IV Infusions:  IVF  125/hr        PRN Meds:    Network Utilization Review Department  ATTENTION: Please call with any questions or concerns to [phone] and carefully listen to the prompts so that you are directed to the right person  All voicemails are confidential   Roseann Gross all requests for admission clinical reviews, approved or denied determinations and any other requests to dedicated fax number below belonging to the campus where the patient is receiving treatment   List of dedicated fax numbers for the Facilities:  [address] DENIALS (Administrative/Medical Necessity) [phone]   [address] (Maternity/NICU/Pediatrics) [phone]   913 Nola Nixon [phone]   [facility] Ayah  [phone]   [address] [place] [address] Oscar Contreras [place] [address] [phone]   1559 [facility] Valentín percy [facility] 134 815 [facility] [phone]

## 2022-11-11 NOTE — UTILIZATION REVIEW
NOTIFICATION OF ADMISSION DISCHARGE   This is a Notification of Discharge from [address]  Please be advised that this patient has been discharge from our facility  Below you will find the admission and discharge date and time including the patient’s disposition  UTILIZATION REVIEW CONTACT:  Kimberly Morocho  Utilization   Network Utilization Review Department  Phone: [phone] x carefully listen to the prompts  All voicemails are confidential   Email: [email or web address]     ADMISSION INFORMATION  PRESENTATION DATE: 11/10/2022  3:05 AM  OBERVATION ADMISSION DATE:   INPATIENT ADMISSION DATE: 11/10/22 10:34 AM   DISCHARGE DATE: 11/10/2022  7:05 PM  DISPOSITION: Home/Self Care Home/Self Care      IMPORTANT INFORMATION:  Send all requests for admission clinical reviews, approved or denied determinations and any other requests to dedicated fax number below belonging to the campus where the patient is receiving treatment   List of dedicated fax numbers:  [address] DENIALS (Administrative/Medical Necessity) [phone]   [address] (Maternity/NICU/Pediatrics) [phone]   [facility] [phone]   [place] [address] [phone]   [address] [phone]   [address] [facility] [phone]   90 [facility] [phone]   1463 [facility] 119 [phone]   [facility]  [phone] 4058 [facility] [phone] 412 [facility] [address] [facility] [phone]

## 2023-11-06 ENCOUNTER — TELEPHONE (OUTPATIENT)
Dept: BARIATRICS | Facility: CLINIC | Age: 33
End: 2023-11-06

## 2023-12-22 NOTE — DISCHARGE INSTRUCTIONS
General Surgery Discharge Instructions      1  General: You will feel pulling sensations around the wound or funny aches and pains around the incisions  You may have some bruising or mild redness  This is normal  Even minor surgery is a change in your body and this is your body’s reaction to it  If you have had abdominal surgery, it may help to support the incision with a small pillow or blanket for comfort when moving or coughing  There may be some hardness surrounding your incision which is your body's normal reaction to surgery  This typically softens up over time  A heating pad or ice pack can also be beneficial in the post-op period  2  Wound care: Make sure to remove the overlying dressing/bandage in about 24 hours, unless instructed otherwise  You usually don't have to redress the wound after 24-48 hours, unless for comfort  Keep the incision clean and dry  Let air get to it  If this Steri-Strips fall off, just keep the wound clean  If there is surgical glue in place this will usually start to soften in around 2 weeks and will eventually dissolve or fall off with gentle scrubbing in the shower  3  Water: You may shower over the wound, unless there are drain tubes left in place  Do not bathe or use a pool or hot tub until cleared by the physician  Do not swim in a lake or ocean until cleared by your physician  You may shower right over the staples or Steri-Strips and packing dry when you are done  4  Activity: You may go up and down stairs, walk as much as you are comfortable, but walk at least 3 times each day  If you have had abdominal surgery, do not lift anything heavier than 15 pounds for at least 2-4 weeks, unless cleared by the doctor  Notes and paperwork for your job are typically filled out at your post-op appointment but if there is a time constraint please call the office for assistance if this is needed prior to your post-op check  5  Diet: You may resume a regular diet   If you had a same-day surgery or overnight stay surgery, you may wish to eat lightly for a few days: soups, crackers, and sandwiches  You may resume a regular diet when ready  6  Medications: Resume all of your previous medications, unless told otherwise by the doctor  Ibuprofen (Advil, Motrin, etc ) is usually ok unless specifically discouraged by your surgeon  400-600 mg of ibuprofen every 6 hours for post-surgical pain is an acceptable regimen with a maximum dosage of 2400 mg per day  Avoid ibuprofen if you are taking aspirin  If you have a bleeding disorder or have stomach issues, talk to your surgeon prior to use  Tylenol is ok, unless you are taking any narcotic pain medication containing Tylenol (such as Percocet, Darvocet, Vicodin, or anything containing acetaminophen)  Be cautious taking additional Tylenol if you're taking these medications as there is a maximum dosage of 4,000 mg of Tylenol per day  You do not need to take the narcotic pain medications unless you are having significant pain and discomfort  7  Driving: You will need someone to drive you home on the day of surgery  Do not drive or make any important decisions while on narcotic pain medication or 24 hours and after anesthesia or sedation for surgery  Generally, you may drive when you are off all narcotic pain medications  8  Upset Stomach: You may take Maalox, Tums, or similar items for an upset stomach  If your narcotic pain medication causes an upset stomach, do not take it on an empty stomach  Try taking it with at least some crackers or toast      9  Constipation: Patients often experience constipation after surgery due to anesthesia and pain medication  This is especially common after abdominal surgery  You may take over-the-counter medication for this, such as Metamucil, Senokot, Dulcolax, milk of magnesia, etc  You may take a suppository unless you have had anorectal surgery such as a procedure on your hemorrhoids   If you experience significant nausea or vomiting after abdominal surgery, call the office before trying any of these medications  10  Pain: You may be given a prescription for pain  This will be prescribed the day of surgery and sent to your pharmacy of choice  Take the pain medication as prescribed, only if you need it  Narcotic pain medications (such as anything containing hydrocodone or oxycodone) have many side effects such as nausea/vomiting, constipation, dizziness and respiratory depression  Do not drive when taking the pain medication  Do not take more than prescribed in the 4-6 hour time period  11  Sexual Activity: You may resume sexual activity when you feel ready and comfortable and your incision is sealed and healed without apparent infection risk  12  Urination: If you haven't urinated in 6 hours and are unable to urinate please call the office  If you are having pain and can not urinate you need to be evaluated by a physician and should go to the emergency room  Call the office: If you are experiencing any of the following, fevers above 101 5°, significant nausea or vomiting, if the wound develops drainage and/or has excessive redness around the wound, or if you have significant diarrhea or other worsening symptoms  Female

## 2024-11-04 ENCOUNTER — TELEPHONE (OUTPATIENT)
Dept: BARIATRICS | Facility: CLINIC | Age: 34
End: 2024-11-04

## 2024-11-04 NOTE — TELEPHONE ENCOUNTER
----- Message from Татьяна LEIJA sent at 10/31/2024  9:47 AM EDT -----  Regardin year f/u appointment  Please contact patient to schedule a 2 year follow up appointment and document the results of the call in EPIC.  Thank You

## (undated) DEVICE — ENDOPATH XCEL UNIVERSAL TROCAR STABLILITY SLEEVES: Brand: ENDOPATH XCEL

## (undated) DEVICE — INTENDED FOR TISSUE SEPARATION, AND OTHER PROCEDURES THAT REQUIRE A SHARP SURGICAL BLADE TO PUNCTURE OR CUT.: Brand: BARD-PARKER SAFETY BLADES SIZE 11, STERILE

## (undated) DEVICE — 5 MM CURVED DISSECTORS WITH MONOPOLAR CAUTERY: Brand: ENDOPATH

## (undated) DEVICE — ABSORBABLE WOUND CLOSURE DEVICE: Brand: V-LOC 90

## (undated) DEVICE — COLUMN DRAPE

## (undated) DEVICE — ELECTRODE LAP J HOOK SPLIT STEM E-Z CLEAN 33CM -0021S

## (undated) DEVICE — VIAL DECANTER

## (undated) DEVICE — KIT, ROBOTIC BARIATRIC: Brand: CARDINAL HEALTH

## (undated) DEVICE — SUT VICRYL 0 UR-6 27 IN J603H

## (undated) DEVICE — CHLORAPREP HI-LITE 26ML ORANGE

## (undated) DEVICE — VIOLET BRAIDED (POLYGLACTIN 910), SYNTHETIC ABSORBABLE SUTURE: Brand: COATED VICRYL

## (undated) DEVICE — SYRINGE 30ML LL

## (undated) DEVICE — VESSEL SEALER EXTEND: Brand: ENDOWRIST

## (undated) DEVICE — [HIGH FLOW INSUFFLATOR,  DO NOT USE IF PACKAGE IS DAMAGED,  KEEP DRY,  KEEP AWAY FROM SUNLIGHT,  PROTECT FROM HEAT AND RADIOACTIVE SOURCES.]: Brand: PNEUMOSURE

## (undated) DEVICE — TROCAR: Brand: KII FIOS FIRST ENTRY

## (undated) DEVICE — GLOVE INDICATOR PI UNDERGLOVE SZ 6.5 BLUE

## (undated) DEVICE — ARM DRAPE

## (undated) DEVICE — BLADELESS OBTURATOR: Brand: WECK VISTA

## (undated) DEVICE — ENDOPATH XCEL BLADELESS TROCARS WITH STABILITY SLEEVES: Brand: ENDOPATH XCEL

## (undated) DEVICE — LIGACLIP 10-M/L, 10MM ENDOSCOPIC ROTATING MULTIPLE CLIP APPLIERS: Brand: LIGACLIP

## (undated) DEVICE — BLUE HEAT SCOPE WARMER

## (undated) DEVICE — PLUMEPEN PRO 10FT

## (undated) DEVICE — MEGA SUTURECUT ND: Brand: ENDOWRIST

## (undated) DEVICE — IRRIG ENDO FLO TUBING

## (undated) DEVICE — STAPLER CANNULA SEAL: Brand: ENDOWRIST

## (undated) DEVICE — SCD SEQUENTIAL COMPRESSION COMFORT SLEEVE MEDIUM KNEE LENGTH: Brand: KENDALL SCD

## (undated) DEVICE — STAPLER 60 RELOAD WHITE: Brand: SUREFORM

## (undated) DEVICE — GLOVE SRG BIOGEL 7.5

## (undated) DEVICE — ENDOPATH PNEUMONEEDLE INSUFFLATION NEEDLES WITH LUER LOCK CONNECTORS 120MM: Brand: ENDOPATH

## (undated) DEVICE — ANTI-FOG SOLUTION WITH FOAM PAD: Brand: DEVON

## (undated) DEVICE — TROCAR: Brand: KII® SLEEVE

## (undated) DEVICE — SUT MONOCRYL 4-0 PS-2 27 IN Y426H

## (undated) DEVICE — TUBING SMOKE EVAC W/FILTRATION DEVICE PLUMEPORT ACTIV

## (undated) DEVICE — REDUCER: Brand: ENDOWRIST

## (undated) DEVICE — STAPLER 60: Brand: SUREFORM

## (undated) DEVICE — CANNULA SEAL

## (undated) DEVICE — STAPLER 60 RELOAD BLUE: Brand: SUREFORM

## (undated) DEVICE — ADHESIVE SKIN HIGH VISCOSITY EXOFIN 1ML

## (undated) DEVICE — GLOVE SRG BIOGEL 6.5

## (undated) DEVICE — TISSUE RETRIEVAL SYSTEM: Brand: INZII RETRIEVAL SYSTEM

## (undated) DEVICE — ENDOPATH 5MM CURVED SCISSORS WITH MONOPOLAR CAUTERY: Brand: ENDOPATH

## (undated) DEVICE — DRAPE EQUIPMENT RF WAND

## (undated) DEVICE — WEBRIL 6 IN UNSTERILE

## (undated) DEVICE — PMI DISPOSABLE PUNCTURE CLOSURE DEVICE / SUTURE GRASPER: Brand: PMI

## (undated) DEVICE — STRL COTTON TIP APPLCTR 6IN PK: Brand: CARDINAL HEALTH

## (undated) DEVICE — ASTOUND STANDARD SURGICAL GOWN, XL: Brand: CONVERTORS

## (undated) DEVICE — VISIGI 3D®  CALIBRATION SYSTEM  SIZE 36FR SLEEVE/STD: Brand: BOEHRINGER® VISIGI 3D™ SLEEVE GASTRECTOMY CALIBRATION SYSTEM, SIZE 36FR

## (undated) DEVICE — CADIERE FORCEPS: Brand: ENDOWRIST

## (undated) DEVICE — ALLENTOWN LAP CHOLE APP PACK: Brand: CARDINAL HEALTH